# Patient Record
Sex: MALE | Race: WHITE | NOT HISPANIC OR LATINO | Employment: FULL TIME | URBAN - METROPOLITAN AREA
[De-identification: names, ages, dates, MRNs, and addresses within clinical notes are randomized per-mention and may not be internally consistent; named-entity substitution may affect disease eponyms.]

---

## 2023-04-20 ENCOUNTER — TELEPHONE (OUTPATIENT)
Dept: PRIMARY CARE | Facility: CLINIC | Age: 63
End: 2023-04-20
Payer: COMMERCIAL

## 2023-04-20 NOTE — TELEPHONE ENCOUNTER
Patient calling about hernia, he states it is improving but he has not been lifting at all, asking how long you think will take to heal or when he can start lifting things again?  He did not see surgeon about this

## 2023-06-06 LAB
ALANINE AMINOTRANSFERASE (SGPT) (U/L) IN SER/PLAS: 24 U/L (ref 10–52)
ALBUMIN (G/DL) IN SER/PLAS: 4.2 G/DL (ref 3.4–5)
ALKALINE PHOSPHATASE (U/L) IN SER/PLAS: 45 U/L (ref 33–136)
ASPARTATE AMINOTRANSFERASE (SGOT) (U/L) IN SER/PLAS: 23 U/L (ref 9–39)
BILIRUBIN DIRECT (MG/DL) IN SER/PLAS: 0.1 MG/DL (ref 0–0.3)
BILIRUBIN TOTAL (MG/DL) IN SER/PLAS: 0.4 MG/DL (ref 0–1.2)
PROTEIN TOTAL: 6.6 G/DL (ref 6.4–8.2)

## 2023-06-09 ENCOUNTER — HOSPITAL ENCOUNTER (OUTPATIENT)
Dept: DATA CONVERSION | Facility: HOSPITAL | Age: 63
End: 2023-06-09
Attending: SURGERY | Admitting: SURGERY
Payer: COMMERCIAL

## 2023-06-09 DIAGNOSIS — K40.20 BILATERAL INGUINAL HERNIA, WITHOUT OBSTRUCTION OR GANGRENE, NOT SPECIFIED AS RECURRENT: ICD-10-CM

## 2023-06-09 LAB
HEMATOCRIT (%) IN BLOOD BY AUTOMATED COUNT: 45.8 % (ref 41–52)
HEMOGLOBIN (G/DL) IN BLOOD: 15.4 G/DL (ref 13.5–17.5)

## 2023-07-24 ENCOUNTER — TELEPHONE (OUTPATIENT)
Dept: PRIMARY CARE | Facility: CLINIC | Age: 63
End: 2023-07-24
Payer: COMMERCIAL

## 2023-07-24 NOTE — TELEPHONE ENCOUNTER
----- Message from Cindi Smith MA sent at 7/24/2023  2:14 PM EDT -----  Regarding: FW: Lyme disease Lab Test  Contact: [phone]  Please see pcp message. She would like for him to have an appt to discuss it can be a VV.    ----- Message -----  From: Tianna Carbajal MD  Sent: 7/24/2023   2:04 PM EDT  To: Cindi Smith MA  Subject: FW: Lyme disease Lab Test                        I rec a vv at least with marco or jonas  ----- Message -----  From: Cindi Smith MA  Sent: 7/24/2023  11:28 AM EDT  To: Tianna Carbajal MD  Subject: FW: Lyme disease Lab Test                          ----- Message -----  From: Harjit Angeles  Sent: 7/24/2023  11:04 AM EDT  To: #  Subject: Lyme disease Lab Test                            Hello.  I spend a great deal of time outdoors.  About a month or more ago I had a bump on the back of my head.  My wife looked at it and saw a red [place]/dot.  Recently I had an outsized reaction to a poison ivy or thistle resulting in prolonged itchy hives, which are only now beginning to subside.  I'd like to rule out a tick bite/Lyme disease.  Please advise with any recommendations.  Thank you.  Harjit

## 2023-07-27 ENCOUNTER — TELEMEDICINE (OUTPATIENT)
Dept: PRIMARY CARE | Facility: CLINIC | Age: 63
End: 2023-07-27
Payer: COMMERCIAL

## 2023-07-27 ENCOUNTER — LAB (OUTPATIENT)
Dept: LAB | Facility: LAB | Age: 63
End: 2023-07-27
Payer: COMMERCIAL

## 2023-07-27 DIAGNOSIS — R21 RASH OF UNKNOWN CAUSE: Primary | ICD-10-CM

## 2023-07-27 DIAGNOSIS — R21 RASH OF UNKNOWN CAUSE: ICD-10-CM

## 2023-07-27 PROBLEM — R93.1 AGATSTON CORONARY ARTERY CALCIUM SCORE BETWEEN 100 AND 400: Status: ACTIVE | Noted: 2023-07-27

## 2023-07-27 PROBLEM — K46.9 HERNIA, ABDOMINAL: Status: ACTIVE | Noted: 2023-07-27

## 2023-07-27 PROBLEM — R97.20 ELEVATED PSA: Status: ACTIVE | Noted: 2023-07-27

## 2023-07-27 PROBLEM — Z87.09 HISTORY OF ASTHMA: Status: ACTIVE | Noted: 2023-07-27

## 2023-07-27 PROBLEM — E78.5 DYSLIPIDEMIA: Status: ACTIVE | Noted: 2023-07-27

## 2023-07-27 PROBLEM — M25.50 MULTIPLE JOINT PAIN: Status: ACTIVE | Noted: 2023-07-27

## 2023-07-27 PROBLEM — K40.20 NON-RECURRENT BILATERAL INGUINAL HERNIA WITHOUT OBSTRUCTION OR GANGRENE: Status: ACTIVE | Noted: 2023-07-27

## 2023-07-27 PROBLEM — Z12.5 SCREENING FOR PROSTATE CANCER: Status: ACTIVE | Noted: 2023-07-27

## 2023-07-27 PROBLEM — I25.10 ARTERIOSCLEROSIS OF CORONARY ARTERY: Status: ACTIVE | Noted: 2023-07-27

## 2023-07-27 PROBLEM — E55.9 VITAMIN D DEFICIENCY: Status: ACTIVE | Noted: 2023-07-27

## 2023-07-27 LAB
ALANINE AMINOTRANSFERASE (SGPT) (U/L) IN SER/PLAS: 21 U/L (ref 10–52)
ALBUMIN (G/DL) IN SER/PLAS: 4.7 G/DL (ref 3.4–5)
ALKALINE PHOSPHATASE (U/L) IN SER/PLAS: 47 U/L (ref 33–136)
ASPARTATE AMINOTRANSFERASE (SGOT) (U/L) IN SER/PLAS: 23 U/L (ref 9–39)
BILIRUBIN DIRECT (MG/DL) IN SER/PLAS: 0.1 MG/DL (ref 0–0.3)
BILIRUBIN TOTAL (MG/DL) IN SER/PLAS: 0.4 MG/DL (ref 0–1.2)
PROTEIN TOTAL: 7.2 G/DL (ref 6.4–8.2)

## 2023-07-27 PROCEDURE — 86618 LYME DISEASE ANTIBODY: CPT

## 2023-07-27 PROCEDURE — 36415 COLL VENOUS BLD VENIPUNCTURE: CPT

## 2023-07-27 PROCEDURE — 99213 OFFICE O/P EST LOW 20 MIN: CPT | Performed by: NURSE PRACTITIONER

## 2023-07-27 RX ORDER — ATORVASTATIN CALCIUM 20 MG/1
1 TABLET, FILM COATED ORAL NIGHTLY
COMMUNITY
Start: 2022-07-07 | End: 2024-02-19 | Stop reason: SDUPTHER

## 2023-07-27 RX ORDER — CHOLECALCIFEROL (VITAMIN D3) 25 MCG
1 TABLET ORAL DAILY
COMMUNITY
Start: 2022-05-20

## 2023-07-27 NOTE — PROGRESS NOTES
Problem List Items Addressed This Visit    None  Visit Diagnoses       Rash of unknown cause    -  Primary    sounds to be contact derm  - declined medrol and/or topical steroid  - may need derm   check Lyme for reassurance given time spent in the woods  fu prn    Relevant Orders    Lyme AB Modified 2-Tier Testing, 1st Tier           An interactive audio/visual telecommunication system which permits real time communications between the patient (at the originating site) and provider (at the distant site) was utilized to provide this telehealth service.    Verbal consent was requested and obtained from the patient for this telehealth visit.  We have confirmed the patient wishes to see me, Lyndsey Helton, a board certified family nurse practitioner with an active [place] APRN license as well as verified the patient's identity and physical location in [place].    Subjective   Patient ID: Harjti Angeles is a 63 y.o. male who presents for No chief complaint on file..  HPI  Pulled weeds beginning of June  Within days entire body covered in hives  Lingering but greatly improved  - started on forearms (pulled weeds)  - bilat sides  - back  Rash resolved in most areas but skin remains very itchy  Hot shower exacerbates this rash  Spots of red  Doesn't think raised  Blanchable per his report  Skin is intact no drainage   No noticeable bulls eye     Completed terbinafine completed 10 days ago  - took for 90 days  No new meds, herbals, supps, teas, contacts  No new animals in the home     Tried some benadryl in the first few days  Eucerin skin calming cream wo relief     He did have this rash when he had hernia surgery 6/9/23     Fatigue - no   Anorexia -no  Headache - no  Neck stiffness - no  Myalgias - no  Arthralgias - no  Regional lymphadenopathy - no  Fever - no    Lots of time outdoors  Outing in woods   - CVNP  Felt bump back of his head  Small red spot  When he got the hives he wonders if this bump wasn't a tick bite     No hx  "Lyme  Has had tick bite previous   He was Lyme negative 5/2022        Review of Systems   All other systems reviewed and are negative.      BP Readings from Last 3 Encounters:   05/18/23 140/84   02/13/23 124/71   02/01/23 138/83      Wt Readings from Last 3 Encounters:   05/18/23 66.7 kg (147 lb 2 oz)   02/13/23 69.5 kg (153 lb 4 oz)   02/01/23 69.9 kg (154 lb)      BMI:   Estimated body mass index is 23.04 kg/m² as calculated from the following:    Height as of 5/18/23: 1.702 m (5' 7\").    Weight as of 5/18/23: 66.7 kg (147 lb 2 oz).    Objective   Physical Exam  Gen: Alert, NAD  Respiratory:  resp effort NL  Neuro:  coordination intact   Mood: normal    Unable to assess skin via virtual visit   "

## 2023-07-31 LAB — B. BURGDORFERI VLSE1/PEPC10 ABS, ELISA: 0.38 IV

## 2023-09-07 VITALS — HEIGHT: 67 IN | BODY MASS INDEX: 23.11 KG/M2 | WEIGHT: 147.27 LBS

## 2023-09-30 NOTE — H&P
History & Physical Reviewed:   I have reviewed the History and Physical dated:  18-May-2023   History and Physical reviewed and relevant findings noted. Patient examined to review pertinent physical  findings.: No significant changes   Home Medications Reviewed: no changes noted   Allergies Reviewed: no changes noted       ERAS (Enhanced Recovery After Surgery):  ·  ERAS Patient: no     Consent:   COVID-19 Consent:  ·  COVID-19 Risk Consent Surgeon has reviewed key risks related to the risk of maverick COVID-19 and if they contract COVID-19 what the risks are.       Electronic Signatures:  Octavio Harrell)  (Signed 09-Jun-2023 14:24)   Authored: History & Physical Reviewed, ERAS, Consent,  Note Completion      Last Updated: 09-Jun-2023 14:24 by Octavio Harrell)

## 2023-10-02 NOTE — OP NOTE
PROCEDURE DETAILS    Preoperative Diagnosis:  Bilateral inguinal hernia, without obstruction or gangrene, not specified as recurrent, K40.20    Postoperative Diagnosis:  Bilateral inguinal hernia, without obstruction or gangrene, not specified as recurrent, K40.20    Surgeon: Octavio Harrell  Resident/Fellow/Other Assistant: Elijah Alcazar    Procedure:  1. ROBOTIC ASSISTED LAPAROSCOPIC BILATERAL INGUINAL HERNIA REPAIR WITH MESH    Anesthesia: No anesthesiologist associated with this case  Estimated Blood Loss: 0  Findings: bilateral direct inguinal hernias  Specimens(s) Collected: no,     Patient Returned To/Condition: improved                                Attestation:   Note Completion:  Attending Attestation I performed the procedure without a resident         Electronic Signatures:  Octavio Harrell)  (Signed 09-Jun-2023 14:24)   Authored: Post-Operative Note, Chart Review, Note Completion      Last Updated: 09-Jun-2023 14:24 by Octavio Harrell)

## 2023-10-16 DIAGNOSIS — R97.20 ABNORMAL PROSTATE SPECIFIC ANTIGEN (PSA): Primary | ICD-10-CM

## 2023-10-17 PROBLEM — L60.3 NAIL DYSTROPHY: Status: ACTIVE | Noted: 2019-07-15
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
## 2023-10-17 PROBLEM — D18.01 HEMANGIOMA OF SKIN AND SUBCUTANEOUS TISSUE: Status: ACTIVE | Noted: 2020-11-03

## 2023-10-17 PROBLEM — S40.862A TICK BITE OF LEFT UPPER ARM: Status: ACTIVE | Noted: 2023-10-17

## 2023-10-17 PROBLEM — L81.4 OTHER MELANIN HYPERPIGMENTATION: Status: ACTIVE | Noted: 2020-11-03

## 2023-10-17 PROBLEM — L82.1 OTHER SEBORRHEIC KERATOSIS: Status: ACTIVE | Noted: 2020-11-03

## 2023-10-17 PROBLEM — B35.1 ONYCHOMYCOSIS: Status: ACTIVE | Noted: 2021-08-01

## 2023-10-17 PROBLEM — L60.8 DISCOLORED NAILS: Status: ACTIVE | Noted: 2019-07-15

## 2023-10-17 PROBLEM — D22.5 MELANOCYTIC NEVI OF TRUNK: Status: ACTIVE | Noted: 2020-11-03

## 2023-10-17 RX ORDER — TERBINAFINE HYDROCHLORIDE 250 MG/1
250 TABLET ORAL
COMMUNITY
Start: 2023-04-21 | End: 2024-02-12 | Stop reason: WASHOUT

## 2023-10-17 RX ORDER — CICLOPIROX 80 MG/ML
SOLUTION TOPICAL
COMMUNITY
End: 2024-02-12 | Stop reason: WASHOUT

## 2023-10-19 ENCOUNTER — OFFICE VISIT (OUTPATIENT)
Dept: ORTHOPEDIC SURGERY | Facility: CLINIC | Age: 63
End: 2023-10-19
Payer: COMMERCIAL

## 2023-10-19 DIAGNOSIS — S83.281A TEAR OF LATERAL MENISCUS OF RIGHT KNEE, CURRENT, UNSPECIFIED TEAR TYPE, INITIAL ENCOUNTER: Primary | ICD-10-CM

## 2023-10-19 PROCEDURE — 99203 OFFICE O/P NEW LOW 30 MIN: CPT | Performed by: ORTHOPAEDIC SURGERY

## 2023-10-19 PROCEDURE — 1036F TOBACCO NON-USER: CPT | Performed by: ORTHOPAEDIC SURGERY

## 2023-10-19 RX ORDER — METHYLPREDNISOLONE 4 MG/1
TABLET ORAL
Qty: 21 TABLET | Refills: 0 | Status: SHIPPED | OUTPATIENT
Start: 2023-10-19 | End: 2024-02-12 | Stop reason: WASHOUT

## 2023-10-19 RX ORDER — IBUPROFEN 800 MG/1
800 TABLET ORAL EVERY 8 HOURS PRN
Qty: 90 TABLET | Refills: 0 | Status: SHIPPED | OUTPATIENT
Start: 2023-10-19 | End: 2023-11-27 | Stop reason: SDUPTHER

## 2023-10-19 ASSESSMENT — PAIN SCALES - GENERAL: PAINLEVEL_OUTOF10: 2

## 2023-10-19 ASSESSMENT — PAIN - FUNCTIONAL ASSESSMENT: PAIN_FUNCTIONAL_ASSESSMENT: 0-10

## 2023-10-19 NOTE — PROGRESS NOTES
History of Present Illness   Chief Complaint   Patient presents with    Right Knee - Pain     Rt Knee Pain, Xrays 3/1/23       History of Present Illness   Patient presents after sustaining an injury to their knee in December 2022 while picking up some furniture.  He felt a twist and something pop inside the knee.  He struggled the last year to get any significant progress.  He states he has been unable to run or extended periods of activities his knee has a reproduce mechanical symptoms and swells.  He is done ibuprofen ice home exercise over-the-counter bracing and continues to have mechanical symptoms.  They are complaining of ache, pain and discomfort.  The knee continues to swell since the injury.  Patient also complains of mechanical symptoms of catching, locking and popping.  Pain is aggravated by use and relieved by rest.  []  Imaging  Radiographs Knee: No acute fractures or dislocations.  No arthritic change and well-preserved joint space    Assessment:   Right knee lateral meniscus tear  Right knee hamstring tendinitis/IT band syndrome    Plan:  We reviewed the role of imaging, physical therapy, injections and the time frame to healing and correlation with outcome.  At this point I recommended getting an MRI and advanced imaging for potential underlying internal derangement.  Discussed the possibility of meniscus, cartilage or ligament pathology and the potential need for surgery.  Patient will plan to follow-up with us after MRI for further recommendations on ongoing care    Medrol Dosepak  NSAID: Ibuprofen 800 mg prescription sent to the pharmacy.  GI side effects and medical risks discussed  Ice: 30 minutes on and off  Exercise home program: Medically directed knee therapy / Handout given       Physical Exam  Well-nourished, well-developed. No acute distress. Alert and oriented x3. Responds appropriately to questioning. Good mood. Normal affect.  Physical Exam  Right knee:  Skin healthy and intact  No  gross swelling or ecchymosis  Trace to 1+ Effusion:   ROM: 120  Crepitance with range of motion  Pain along the posterior lateral joint line.  Pain along the posterior biceps near the popliteal tendon  Positive Ry´s test/PositiveApley Grind  Neurovascular exam normal distally  Palpable pedal pulse and good cap refill     Review of Systems   GENERAL: Negative for malaise, significant weight loss, fever  MUSCULOSKELETAL: See HPI  NEURO:  Negative     History reviewed. No pertinent past medical history.    Medication Documentation Review Audit       Reviewed by Dipak Garg (Technologist) on 10/19/23 at 0902      Medication Order Taking? Sig Documenting Provider Last Dose Status   atorvastatin (Lipitor) 20 mg tablet [id number]  Take 1 tablet (20 mg) by mouth once daily at bedtime. Historical Provider, MD  Active   cholecalciferol (Vitamin D-3) 25 MCG (1000 UT) tablet [id number]  Take 1 tablet (25 mcg) by mouth once daily. Historical Provider, MD  Active   ciclopirox (Penlac) 8 % solution [id number]  APPLY TO AFFECTED AREA DAILY AT BEDTIME. Historical Provider, MD  Active   terbinafine (LamISIL) 250 mg tablet [id number]  Take 1 tablet (250 mg) by mouth once daily. Historical Provider, MD  Active                    No Known Allergies    Social History     Socioeconomic History    Marital status:      Spouse name: Not on file    Number of children: Not on file    Years of education: Not on file    Highest education level: Not on file   Occupational History    Not on file   Tobacco Use    Smoking status: Never    Smokeless tobacco: Never   Substance and Sexual Activity    Alcohol use: Not on file    Drug use: Not on file    Sexual activity: Not on file   Other Topics Concern    Not on file   Social History Narrative    Not on file     Social Determinants of Health     Financial Resource Strain: Not on file   Food Insecurity: Not on file   Transportation Needs: Not on file   Physical Activity: Not on file   Stress: Not  on file   Social Connections: Not on file   Intimate Partner Violence: Not on file   Housing Stability: Not on file       Past Surgical History:   Procedure Laterality Date    HERNIA REPAIR      bilateral inguinal hernia repair; 6/9/2023    OTHER SURGICAL HISTORY  02/15/2021    Colonoscopy    OTHER SURGICAL HISTORY  02/15/2021    Tonsillectomy       No results found.

## 2023-11-14 ENCOUNTER — ANCILLARY PROCEDURE (OUTPATIENT)
Dept: RADIOLOGY | Facility: CLINIC | Age: 63
End: 2023-11-14
Payer: COMMERCIAL

## 2023-11-14 DIAGNOSIS — S83.281A TEAR OF LATERAL MENISCUS OF RIGHT KNEE, CURRENT, UNSPECIFIED TEAR TYPE, INITIAL ENCOUNTER: ICD-10-CM

## 2023-11-14 PROCEDURE — 73721 MRI JNT OF LWR EXTRE W/O DYE: CPT | Mod: RT

## 2023-11-14 PROCEDURE — 73721 MRI JNT OF LWR EXTRE W/O DYE: CPT | Mod: RIGHT SIDE | Performed by: RADIOLOGY

## 2023-11-27 DIAGNOSIS — S83.281A TEAR OF LATERAL MENISCUS OF RIGHT KNEE, CURRENT, UNSPECIFIED TEAR TYPE, INITIAL ENCOUNTER: ICD-10-CM

## 2023-11-27 RX ORDER — IBUPROFEN 800 MG/1
800 TABLET ORAL EVERY 8 HOURS PRN
Qty: 90 TABLET | Refills: 0 | Status: SHIPPED | OUTPATIENT
Start: 2023-11-27 | End: 2023-12-29 | Stop reason: SDUPTHER

## 2023-12-11 ENCOUNTER — OFFICE VISIT (OUTPATIENT)
Dept: ORTHOPEDIC SURGERY | Facility: CLINIC | Age: 63
End: 2023-12-11
Payer: COMMERCIAL

## 2023-12-11 ENCOUNTER — HOSPITAL ENCOUNTER (OUTPATIENT)
Facility: HOSPITAL | Age: 63
Setting detail: OUTPATIENT SURGERY
End: 2023-12-11
Attending: ORTHOPAEDIC SURGERY | Admitting: ORTHOPAEDIC SURGERY
Payer: COMMERCIAL

## 2023-12-11 DIAGNOSIS — S83.231D COMPLEX TEAR OF MEDIAL MENISCUS OF RIGHT KNEE AS CURRENT INJURY, SUBSEQUENT ENCOUNTER: Primary | ICD-10-CM

## 2023-12-11 PROBLEM — S83.241A OTHER TEAR OF MEDIAL MENISCUS, CURRENT INJURY, RIGHT KNEE, INITIAL ENCOUNTER: Status: ACTIVE | Noted: 2023-12-11

## 2023-12-11 PROCEDURE — 99214 OFFICE O/P EST MOD 30 MIN: CPT | Performed by: ORTHOPAEDIC SURGERY

## 2023-12-11 PROCEDURE — 1036F TOBACCO NON-USER: CPT | Performed by: ORTHOPAEDIC SURGERY

## 2023-12-11 NOTE — PROGRESS NOTES
History of Present Illness   Chief Complaint   Patient presents with    Right Knee - Follow-up     MRI REVIEW       History of Present Illness   Patient presents after sustaining an injury to their knee in December 2022 while picking up some furniture.  He felt a twist and something pop inside the knee.  He struggled the last year to get any significant progress.  He states he has been unable to run or extended periods of activities his knee has a reproduce mechanical symptoms and swells.  He is done ibuprofen ice home exercise over-the-counter bracing and continues to have mechanical symptoms.  They are complaining of ache, pain and discomfort.  The knee continues to swell since the injury.  Patient also complains of mechanical symptoms of catching, locking and popping.  Pain is aggravated by use and relieved by rest.  He is a  for a adult assistance/assisted living/nursing home  Imaging  Radiographs Knee: No acute fractures or dislocations.  No arthritic change and well-preserved joint space  MRI: Radial flap tear of the medial meniscus.  It is flipped underneath the medial tibia and the posterior medial aspect.  Root is intact.  Some thickening near the popliteus.    Assessment:   Right knee medial meniscus tear  Right knee hamstring tendinitis/IT band syndrome/popliteal tendinitis    Plan:  Menisectomy plan of care:  Risks benefits and alternatives to surgery were discussed including but not limited to Infection, bleeding, neurovascular injury, pain and dysfunction, hardware related complications including cutout failure breakage, loss of function, motion, and permanent disability as well as the cardiovascular and pulmonary complications from anesthesia including death and DVT. Patient and family accept these risks.  We discussed specifically post meniscectomy pain, incomplete pain relief, meniscus retear, progressive arthritis, intraoperative decisions in regards to other pathology, and the potential for  future revision surgeries including arthroplasty    Plan for outpatient surgery   1. 1 week postop follow up   2. Percocet for postop pain relief. OARRS has been reviewed and is consistent with prescribed medications. This report is scanned into the electronic medical record. The risks of abuse, dependence, addiction and diversion were considered. The medication is felt to be clinically appropriate based on documented diagnosis .  3.  Pre-CERT for removal postoperative knee brace  Physical Exam  Well-nourished, well-developed. No acute distress. Alert and oriented x3. Responds appropriately to questioning. Good mood. Normal affect.  Physical Exam  Right knee:  Skin healthy and intact  No gross swelling or ecchymosis  Trace to 1+ Effusion:   ROM: 120  Crepitance with range of motion  Pain along the posterior lateral joint line.  Pain along the posterior biceps near the popliteal tendon  Positive Ry´s test/PositiveApley Grind  Neurovascular exam normal distally  Palpable pedal pulse and good cap refill     Review of Systems   GENERAL: Negative for malaise, significant weight loss, fever  MUSCULOSKELETAL: See HPI  NEURO:  Negative     History reviewed. No pertinent past medical history.    Medication Documentation Review Audit       Reviewed by Pauline Marrero MA (Medical Assistant) on 12/11/23 at 1020      Medication Order Taking? Sig Documenting Provider Last Dose Status   atorvastatin (Lipitor) 20 mg tablet [id number]  Take 1 tablet (20 mg) by mouth once daily at bedtime. Historical Provider, MD  Active   cholecalciferol (Vitamin D-3) 25 MCG (1000 UT) tablet [id number]  Take 1 tablet (25 mcg) by mouth once daily. Historical Provider, MD  Active   ciclopirox (Penlac) 8 % solution [id number]  APPLY TO AFFECTED AREA DAILY AT BEDTIME. Historical Provider, MD  Active   ibuprofen 800 mg tablet [id number]  TAKE 1 TABLET (800 MG) BY MOUTH EVERY 8 HOURS IF NEEDED FOR MILD PAIN (1 - 3). Juan Reaves MD  Active    methylPREDNISolone (Medrol Dospak) 4 mg tablets [id number]  Use as directed by package instructions Juan Reaves MD  Active   terbinafine (LamISIL) 250 mg tablet [id number]  Take 1 tablet (250 mg) by mouth once daily. Historical Provider, MD  Active                    No Known Allergies    Social History     Socioeconomic History    Marital status:      Spouse name: Not on file    Number of children: Not on file    Years of education: Not on file    Highest education level: Not on file   Occupational History    Not on file   Tobacco Use    Smoking status: Never    Smokeless tobacco: Never   Substance and Sexual Activity    Alcohol use: Not on file    Drug use: Not on file    Sexual activity: Not on file   Other Topics Concern    Not on file   Social History Narrative    Not on file     Social Determinants of Health     Financial Resource Strain: Not on file   Food Insecurity: Not on file   Transportation Needs: Not on file   Physical Activity: Not on file   Stress: Not on file   Social Connections: Not on file   Intimate Partner Violence: Not on file   Housing Stability: Not on file       Past Surgical History:   Procedure Laterality Date    HERNIA REPAIR      bilateral inguinal hernia repair; 6/9/2023    OTHER SURGICAL HISTORY  02/15/2021    Colonoscopy    OTHER SURGICAL HISTORY  02/15/2021    Tonsillectomy       No results found.

## 2023-12-20 ENCOUNTER — TELEPHONE (OUTPATIENT)
Dept: ORTHOPEDIC SURGERY | Facility: CLINIC | Age: 63
End: 2023-12-20
Payer: COMMERCIAL

## 2023-12-20 NOTE — TELEPHONE ENCOUNTER
12/20/23  LVM for pt re availability of crutches needed for upcoming sx.  Asked him to contact our office and let us know whether he has some or needs to be scheduled for a fitting.  
[place]

## 2023-12-21 ENCOUNTER — HOSPITAL ENCOUNTER (OUTPATIENT)
Dept: CARDIOLOGY | Facility: CLINIC | Age: 63
Discharge: HOME | End: 2023-12-21
Payer: COMMERCIAL

## 2023-12-21 ENCOUNTER — LAB (OUTPATIENT)
Dept: LAB | Facility: LAB | Age: 63
End: 2023-12-21
Payer: COMMERCIAL

## 2023-12-21 DIAGNOSIS — S83.241A OTHER TEAR OF MEDIAL MENISCUS, CURRENT INJURY, RIGHT KNEE, INITIAL ENCOUNTER: ICD-10-CM

## 2023-12-21 DIAGNOSIS — R97.20 ABNORMAL PROSTATE SPECIFIC ANTIGEN (PSA): ICD-10-CM

## 2023-12-21 LAB
ALBUMIN SERPL BCP-MCNC: 4.4 G/DL (ref 3.4–5)
ALP SERPL-CCNC: 45 U/L (ref 33–136)
ALT SERPL W P-5'-P-CCNC: 15 U/L (ref 10–52)
ANION GAP SERPL CALC-SCNC: 12 MMOL/L (ref 10–20)
APTT PPP: 37 SECONDS (ref 27–38)
AST SERPL W P-5'-P-CCNC: 16 U/L (ref 9–39)
BASOPHILS # BLD AUTO: 0.04 X10*3/UL (ref 0–0.1)
BASOPHILS NFR BLD AUTO: 0.6 %
BILIRUB SERPL-MCNC: 0.6 MG/DL (ref 0–1.2)
BUN SERPL-MCNC: 19 MG/DL (ref 6–23)
CALCIUM SERPL-MCNC: 9.1 MG/DL (ref 8.6–10.3)
CHLORIDE SERPL-SCNC: 106 MMOL/L (ref 98–107)
CO2 SERPL-SCNC: 28 MMOL/L (ref 21–32)
CREAT SERPL-MCNC: 1.07 MG/DL (ref 0.5–1.3)
EOSINOPHIL # BLD AUTO: 0 X10*3/UL (ref 0–0.7)
EOSINOPHIL NFR BLD AUTO: 0 %
ERYTHROCYTE [DISTWIDTH] IN BLOOD BY AUTOMATED COUNT: 12.1 % (ref 11.5–14.5)
GFR SERPL CREATININE-BSD FRML MDRD: 79 ML/MIN/1.73M*2
GLUCOSE SERPL-MCNC: 91 MG/DL (ref 74–99)
HCT VFR BLD AUTO: 42.9 % (ref 41–52)
HGB BLD-MCNC: 14.4 G/DL (ref 13.5–17.5)
IMM GRANULOCYTES # BLD AUTO: 0.01 X10*3/UL (ref 0–0.7)
IMM GRANULOCYTES NFR BLD AUTO: 0.2 % (ref 0–0.9)
INR PPP: 1 (ref 0.9–1.1)
LYMPHOCYTES # BLD AUTO: 1.72 X10*3/UL (ref 1.2–4.8)
LYMPHOCYTES NFR BLD AUTO: 26.6 %
MCH RBC QN AUTO: 31.5 PG (ref 26–34)
MCHC RBC AUTO-ENTMCNC: 33.6 G/DL (ref 32–36)
MCV RBC AUTO: 94 FL (ref 80–100)
MONOCYTES # BLD AUTO: 0.58 X10*3/UL (ref 0.1–1)
MONOCYTES NFR BLD AUTO: 9 %
NEUTROPHILS # BLD AUTO: 4.11 X10*3/UL (ref 1.2–7.7)
NEUTROPHILS NFR BLD AUTO: 63.6 %
NRBC BLD-RTO: 0 /100 WBCS (ref 0–0)
PLATELET # BLD AUTO: 281 X10*3/UL (ref 150–450)
POTASSIUM SERPL-SCNC: 4.5 MMOL/L (ref 3.5–5.3)
PROT SERPL-MCNC: 6.3 G/DL (ref 6.4–8.2)
PROTHROMBIN TIME: 10.8 SECONDS (ref 9.8–12.8)
PSA SERPL-MCNC: 2.13 NG/ML
RBC # BLD AUTO: 4.57 X10*6/UL (ref 4.5–5.9)
SODIUM SERPL-SCNC: 141 MMOL/L (ref 136–145)
WBC # BLD AUTO: 6.5 X10*3/UL (ref 4.4–11.3)

## 2023-12-21 PROCEDURE — 80053 COMPREHEN METABOLIC PANEL: CPT

## 2023-12-21 PROCEDURE — 93005 ELECTROCARDIOGRAM TRACING: CPT

## 2023-12-21 PROCEDURE — 85025 COMPLETE CBC W/AUTO DIFF WBC: CPT

## 2023-12-21 PROCEDURE — 85730 THROMBOPLASTIN TIME PARTIAL: CPT

## 2023-12-21 PROCEDURE — 84153 ASSAY OF PSA TOTAL: CPT

## 2023-12-21 PROCEDURE — 93010 ELECTROCARDIOGRAM REPORT: CPT | Performed by: STUDENT IN AN ORGANIZED HEALTH CARE EDUCATION/TRAINING PROGRAM

## 2023-12-21 PROCEDURE — 85610 PROTHROMBIN TIME: CPT

## 2023-12-21 PROCEDURE — 36415 COLL VENOUS BLD VENIPUNCTURE: CPT

## 2023-12-24 LAB
ATRIAL RATE: 56 BPM
P AXIS: 72 DEGREES
P OFFSET: 192 MS
P ONSET: 145 MS
PR INTERVAL: 144 MS
Q ONSET: 217 MS
QRS COUNT: 10 BEATS
QRS DURATION: 98 MS
QT INTERVAL: 428 MS
QTC CALCULATION(BAZETT): 413 MS
QTC FREDERICIA: 418 MS
R AXIS: 79 DEGREES
T AXIS: 57 DEGREES
T OFFSET: 431 MS
VENTRICULAR RATE: 56 BPM

## 2023-12-26 DIAGNOSIS — S83.281A TEAR OF LATERAL MENISCUS OF RIGHT KNEE, CURRENT, UNSPECIFIED TEAR TYPE, INITIAL ENCOUNTER: ICD-10-CM

## 2023-12-27 ENCOUNTER — TELEPHONE (OUTPATIENT)
Dept: ORTHOPEDIC SURGERY | Facility: CLINIC | Age: 63
End: 2023-12-27
Payer: COMMERCIAL

## 2023-12-27 ENCOUNTER — TELEPHONE (OUTPATIENT)
Dept: PREADMISSION TESTING | Facility: HOSPITAL | Age: 63
End: 2023-12-27

## 2023-12-27 NOTE — TELEPHONE ENCOUNTER
12/27/23  Spoke w/ pt and he does need crutches, but may be postponing sx due to upcoming family vacation.  He will contact us when he wants to schedule for crutches.

## 2023-12-29 RX ORDER — IBUPROFEN 800 MG/1
800 TABLET ORAL EVERY 8 HOURS PRN
Qty: 90 TABLET | Refills: 0 | Status: SHIPPED | OUTPATIENT
Start: 2023-12-29 | End: 2024-01-28

## 2024-01-08 ENCOUNTER — APPOINTMENT (OUTPATIENT)
Dept: ORTHOPEDIC SURGERY | Facility: CLINIC | Age: 64
End: 2024-01-08
Payer: COMMERCIAL

## 2024-02-06 ENCOUNTER — LAB (OUTPATIENT)
Dept: LAB | Facility: LAB | Age: 64
End: 2024-02-06
Payer: COMMERCIAL

## 2024-02-06 DIAGNOSIS — S83.241A OTHER TEAR OF MEDIAL MENISCUS, CURRENT INJURY, RIGHT KNEE, INITIAL ENCOUNTER: ICD-10-CM

## 2024-02-06 LAB
ALBUMIN SERPL BCP-MCNC: 4.4 G/DL (ref 3.4–5)
ALP SERPL-CCNC: 44 U/L (ref 33–136)
ALT SERPL W P-5'-P-CCNC: 17 U/L (ref 10–52)
ANION GAP SERPL CALC-SCNC: 10 MMOL/L (ref 10–20)
APTT PPP: 34 SECONDS (ref 27–38)
AST SERPL W P-5'-P-CCNC: 19 U/L (ref 9–39)
BASOPHILS # BLD AUTO: 0.05 X10*3/UL (ref 0–0.1)
BASOPHILS NFR BLD AUTO: 1 %
BILIRUB SERPL-MCNC: 0.5 MG/DL (ref 0–1.2)
BUN SERPL-MCNC: 15 MG/DL (ref 6–23)
CALCIUM SERPL-MCNC: 9.2 MG/DL (ref 8.6–10.3)
CHLORIDE SERPL-SCNC: 102 MMOL/L (ref 98–107)
CO2 SERPL-SCNC: 28 MMOL/L (ref 21–32)
CREAT SERPL-MCNC: 1.13 MG/DL (ref 0.5–1.3)
EGFRCR SERPLBLD CKD-EPI 2021: 74 ML/MIN/1.73M*2
EOSINOPHIL # BLD AUTO: 0.03 X10*3/UL (ref 0–0.7)
EOSINOPHIL NFR BLD AUTO: 0.6 %
ERYTHROCYTE [DISTWIDTH] IN BLOOD BY AUTOMATED COUNT: 12.1 % (ref 11.5–14.5)
GLUCOSE SERPL-MCNC: 107 MG/DL (ref 74–99)
HCT VFR BLD AUTO: 42.7 % (ref 41–52)
HGB BLD-MCNC: 14.3 G/DL (ref 13.5–17.5)
IMM GRANULOCYTES # BLD AUTO: 0.02 X10*3/UL (ref 0–0.7)
IMM GRANULOCYTES NFR BLD AUTO: 0.4 % (ref 0–0.9)
INR PPP: 1 (ref 0.9–1.1)
LYMPHOCYTES # BLD AUTO: 1.5 X10*3/UL (ref 1.2–4.8)
LYMPHOCYTES NFR BLD AUTO: 28.7 %
MCH RBC QN AUTO: 31 PG (ref 26–34)
MCHC RBC AUTO-ENTMCNC: 33.5 G/DL (ref 32–36)
MCV RBC AUTO: 92 FL (ref 80–100)
MONOCYTES # BLD AUTO: 0.42 X10*3/UL (ref 0.1–1)
MONOCYTES NFR BLD AUTO: 8 %
NEUTROPHILS # BLD AUTO: 3.2 X10*3/UL (ref 1.2–7.7)
NEUTROPHILS NFR BLD AUTO: 61.3 %
NRBC BLD-RTO: 0 /100 WBCS (ref 0–0)
PLATELET # BLD AUTO: 253 X10*3/UL (ref 150–450)
POTASSIUM SERPL-SCNC: 4.6 MMOL/L (ref 3.5–5.3)
PROT SERPL-MCNC: 6.5 G/DL (ref 6.4–8.2)
PROTHROMBIN TIME: 10.9 SECONDS (ref 9.8–12.8)
RBC # BLD AUTO: 4.62 X10*6/UL (ref 4.5–5.9)
SODIUM SERPL-SCNC: 135 MMOL/L (ref 136–145)
WBC # BLD AUTO: 5.2 X10*3/UL (ref 4.4–11.3)

## 2024-02-06 PROCEDURE — 85610 PROTHROMBIN TIME: CPT

## 2024-02-06 PROCEDURE — 85730 THROMBOPLASTIN TIME PARTIAL: CPT

## 2024-02-06 PROCEDURE — 85025 COMPLETE CBC W/AUTO DIFF WBC: CPT

## 2024-02-06 PROCEDURE — 36415 COLL VENOUS BLD VENIPUNCTURE: CPT

## 2024-02-06 PROCEDURE — 80053 COMPREHEN METABOLIC PANEL: CPT

## 2024-02-12 DIAGNOSIS — S83.231D COMPLEX TEAR OF MEDIAL MENISCUS OF RIGHT KNEE AS CURRENT INJURY, SUBSEQUENT ENCOUNTER: ICD-10-CM

## 2024-02-12 NOTE — PREPROCEDURE INSTRUCTIONS
NPO Instructions:  Do not eat any food after midnight the night before your surgery/procedure.    Additional Instructions:

## 2024-02-13 ENCOUNTER — ANESTHESIA EVENT (OUTPATIENT)
Dept: OPERATING ROOM | Facility: HOSPITAL | Age: 64
End: 2024-02-13
Payer: COMMERCIAL

## 2024-02-13 RX ORDER — SODIUM CHLORIDE, SODIUM LACTATE, POTASSIUM CHLORIDE, CALCIUM CHLORIDE 600; 310; 30; 20 MG/100ML; MG/100ML; MG/100ML; MG/100ML
100 INJECTION, SOLUTION INTRAVENOUS CONTINUOUS
Status: CANCELLED | OUTPATIENT
Start: 2024-02-13

## 2024-02-13 RX ORDER — OXYCODONE HYDROCHLORIDE 5 MG/1
5 TABLET ORAL EVERY 4 HOURS PRN
Status: CANCELLED | OUTPATIENT
Start: 2024-02-13

## 2024-02-14 ENCOUNTER — ANESTHESIA (OUTPATIENT)
Dept: OPERATING ROOM | Facility: HOSPITAL | Age: 64
End: 2024-02-14
Payer: COMMERCIAL

## 2024-02-14 ENCOUNTER — HOSPITAL ENCOUNTER (OUTPATIENT)
Facility: HOSPITAL | Age: 64
Setting detail: OUTPATIENT SURGERY
Discharge: HOME | End: 2024-02-14
Attending: ORTHOPAEDIC SURGERY | Admitting: ORTHOPAEDIC SURGERY
Payer: COMMERCIAL

## 2024-02-14 VITALS
RESPIRATION RATE: 16 BRPM | BODY MASS INDEX: 23.01 KG/M2 | WEIGHT: 146.61 LBS | SYSTOLIC BLOOD PRESSURE: 138 MMHG | HEIGHT: 67 IN | TEMPERATURE: 97 F | HEART RATE: 74 BPM | DIASTOLIC BLOOD PRESSURE: 77 MMHG | OXYGEN SATURATION: 99 %

## 2024-02-14 DIAGNOSIS — S83.241A OTHER TEAR OF MEDIAL MENISCUS, CURRENT INJURY, RIGHT KNEE, INITIAL ENCOUNTER: Primary | ICD-10-CM

## 2024-02-14 PROBLEM — E78.2 MODERATE MIXED HYPERLIPIDEMIA NOT REQUIRING STATIN THERAPY: Status: ACTIVE | Noted: 2024-02-14

## 2024-02-14 PROCEDURE — A4217 STERILE WATER/SALINE, 500 ML: HCPCS | Performed by: ORTHOPAEDIC SURGERY

## 2024-02-14 PROCEDURE — 3600000004 HC OR TIME - INITIAL BASE CHARGE - PROCEDURE LEVEL FOUR: Performed by: ORTHOPAEDIC SURGERY

## 2024-02-14 PROCEDURE — 2500000004 HC RX 250 GENERAL PHARMACY W/ HCPCS (ALT 636 FOR OP/ED): Performed by: ANESTHESIOLOGY

## 2024-02-14 PROCEDURE — 3700000002 HC GENERAL ANESTHESIA TIME - EACH INCREMENTAL 1 MINUTE: Performed by: ORTHOPAEDIC SURGERY

## 2024-02-14 PROCEDURE — 7100000010 HC PHASE TWO TIME - EACH INCREMENTAL 1 MINUTE: Performed by: ORTHOPAEDIC SURGERY

## 2024-02-14 PROCEDURE — 7100000001 HC RECOVERY ROOM TIME - INITIAL BASE CHARGE: Performed by: ORTHOPAEDIC SURGERY

## 2024-02-14 PROCEDURE — 2500000004 HC RX 250 GENERAL PHARMACY W/ HCPCS (ALT 636 FOR OP/ED): Performed by: ORTHOPAEDIC SURGERY

## 2024-02-14 PROCEDURE — 7100000009 HC PHASE TWO TIME - INITIAL BASE CHARGE: Performed by: ORTHOPAEDIC SURGERY

## 2024-02-14 PROCEDURE — 29881 ARTHRS KNE SRG MNISECTMY M/L: CPT | Performed by: ORTHOPAEDIC SURGERY

## 2024-02-14 PROCEDURE — 2720000007 HC OR 272 NO HCPCS: Performed by: ORTHOPAEDIC SURGERY

## 2024-02-14 PROCEDURE — 2500000005 HC RX 250 GENERAL PHARMACY W/O HCPCS: Performed by: ANESTHESIOLOGY

## 2024-02-14 PROCEDURE — 7100000002 HC RECOVERY ROOM TIME - EACH INCREMENTAL 1 MINUTE: Performed by: ORTHOPAEDIC SURGERY

## 2024-02-14 PROCEDURE — 2500000005 HC RX 250 GENERAL PHARMACY W/O HCPCS: Performed by: ORTHOPAEDIC SURGERY

## 2024-02-14 PROCEDURE — 3600000009 HC OR TIME - EACH INCREMENTAL 1 MINUTE - PROCEDURE LEVEL FOUR: Performed by: ORTHOPAEDIC SURGERY

## 2024-02-14 PROCEDURE — 3700000001 HC GENERAL ANESTHESIA TIME - INITIAL BASE CHARGE: Performed by: ORTHOPAEDIC SURGERY

## 2024-02-14 RX ORDER — SODIUM CHLORIDE, SODIUM LACTATE, POTASSIUM CHLORIDE, CALCIUM CHLORIDE 600; 310; 30; 20 MG/100ML; MG/100ML; MG/100ML; MG/100ML
100 INJECTION, SOLUTION INTRAVENOUS CONTINUOUS
Status: DISCONTINUED | OUTPATIENT
Start: 2024-02-14 | End: 2024-02-14 | Stop reason: HOSPADM

## 2024-02-14 RX ORDER — BUPIVACAINE HCL/EPINEPHRINE 0.25-.0005
VIAL (ML) INJECTION AS NEEDED
Status: DISCONTINUED | OUTPATIENT
Start: 2024-02-14 | End: 2024-02-14 | Stop reason: HOSPADM

## 2024-02-14 RX ORDER — PROPOFOL 10 MG/ML
INJECTION, EMULSION INTRAVENOUS AS NEEDED
Status: DISCONTINUED | OUTPATIENT
Start: 2024-02-14 | End: 2024-02-14

## 2024-02-14 RX ORDER — FENTANYL CITRATE 50 UG/ML
50 INJECTION, SOLUTION INTRAMUSCULAR; INTRAVENOUS EVERY 5 MIN PRN
Status: DISCONTINUED | OUTPATIENT
Start: 2024-02-14 | End: 2024-02-14 | Stop reason: HOSPADM

## 2024-02-14 RX ORDER — SODIUM CHLORIDE 9 MG/ML
100 INJECTION, SOLUTION INTRAVENOUS CONTINUOUS
Status: DISCONTINUED | OUTPATIENT
Start: 2024-02-14 | End: 2024-02-14 | Stop reason: HOSPADM

## 2024-02-14 RX ORDER — DEXAMETHASONE SODIUM PHOSPHATE 100 MG/10ML
INJECTION INTRAMUSCULAR; INTRAVENOUS AS NEEDED
Status: DISCONTINUED | OUTPATIENT
Start: 2024-02-14 | End: 2024-02-14

## 2024-02-14 RX ORDER — LIDOCAINE HYDROCHLORIDE 20 MG/ML
INJECTION, SOLUTION INFILTRATION; PERINEURAL AS NEEDED
Status: DISCONTINUED | OUTPATIENT
Start: 2024-02-14 | End: 2024-02-14

## 2024-02-14 RX ORDER — FENTANYL CITRATE 50 UG/ML
INJECTION, SOLUTION INTRAMUSCULAR; INTRAVENOUS AS NEEDED
Status: DISCONTINUED | OUTPATIENT
Start: 2024-02-14 | End: 2024-02-14

## 2024-02-14 RX ORDER — PHENYLEPHRINE HCL IN 0.9% NACL 1 MG/10 ML
SYRINGE (ML) INTRAVENOUS AS NEEDED
Status: DISCONTINUED | OUTPATIENT
Start: 2024-02-14 | End: 2024-02-14

## 2024-02-14 RX ORDER — ONDANSETRON HYDROCHLORIDE 2 MG/ML
4 INJECTION, SOLUTION INTRAVENOUS ONCE AS NEEDED
Status: DISCONTINUED | OUTPATIENT
Start: 2024-02-14 | End: 2024-02-14 | Stop reason: HOSPADM

## 2024-02-14 RX ORDER — MEPERIDINE HYDROCHLORIDE 25 MG/ML
12.5 INJECTION INTRAMUSCULAR; INTRAVENOUS; SUBCUTANEOUS EVERY 10 MIN PRN
Status: DISCONTINUED | OUTPATIENT
Start: 2024-02-14 | End: 2024-02-14 | Stop reason: HOSPADM

## 2024-02-14 RX ORDER — CEFAZOLIN SODIUM 2 G/100ML
2 INJECTION, SOLUTION INTRAVENOUS ONCE
Status: COMPLETED | OUTPATIENT
Start: 2024-02-14 | End: 2024-02-14

## 2024-02-14 RX ORDER — OXYCODONE AND ACETAMINOPHEN 5; 325 MG/1; MG/1
1 TABLET ORAL EVERY 6 HOURS PRN
Qty: 28 TABLET | Refills: 0 | Status: SHIPPED | OUTPATIENT
Start: 2024-02-14 | End: 2024-02-19 | Stop reason: ALTCHOICE

## 2024-02-14 RX ORDER — SODIUM CHLORIDE 0.9 G/100ML
IRRIGANT IRRIGATION AS NEEDED
Status: DISCONTINUED | OUTPATIENT
Start: 2024-02-14 | End: 2024-02-14 | Stop reason: HOSPADM

## 2024-02-14 RX ORDER — MIDAZOLAM HYDROCHLORIDE 1 MG/ML
INJECTION, SOLUTION INTRAMUSCULAR; INTRAVENOUS AS NEEDED
Status: DISCONTINUED | OUTPATIENT
Start: 2024-02-14 | End: 2024-02-14

## 2024-02-14 RX ADMIN — FENTANYL CITRATE 50 MCG: 50 INJECTION, SOLUTION INTRAMUSCULAR; INTRAVENOUS at 10:27

## 2024-02-14 RX ADMIN — MIDAZOLAM 2 MG: 1 INJECTION INTRAMUSCULAR; INTRAVENOUS at 10:05

## 2024-02-14 RX ADMIN — PROPOFOL 150 MG: 10 INJECTION, EMULSION INTRAVENOUS at 10:10

## 2024-02-14 RX ADMIN — CEFAZOLIN SODIUM 2 G: 2 INJECTION, SOLUTION INTRAVENOUS at 10:08

## 2024-02-14 RX ADMIN — SODIUM CHLORIDE 100 ML/HR: 9 INJECTION, SOLUTION INTRAVENOUS at 08:35

## 2024-02-14 RX ADMIN — LIDOCAINE HYDROCHLORIDE 50 MG: 20 INJECTION, SOLUTION INFILTRATION; PERINEURAL at 10:10

## 2024-02-14 RX ADMIN — DEXAMETHASONE SODIUM PHOSPHATE 4 MG: 10 INJECTION INTRAMUSCULAR; INTRAVENOUS at 10:10

## 2024-02-14 RX ADMIN — Medication 200 MCG: at 10:39

## 2024-02-14 RX ADMIN — FENTANYL CITRATE 50 MCG: 50 INJECTION, SOLUTION INTRAMUSCULAR; INTRAVENOUS at 10:14

## 2024-02-14 SDOH — HEALTH STABILITY: MENTAL HEALTH: CURRENT SMOKER: 0

## 2024-02-14 ASSESSMENT — PAIN SCALES - GENERAL
PAINLEVEL_OUTOF10: 2
PAINLEVEL_OUTOF10: 0 - NO PAIN
PAIN_LEVEL: 0
PAINLEVEL_OUTOF10: 1

## 2024-02-14 ASSESSMENT — PAIN - FUNCTIONAL ASSESSMENT
PAIN_FUNCTIONAL_ASSESSMENT: 0-10

## 2024-02-14 ASSESSMENT — COLUMBIA-SUICIDE SEVERITY RATING SCALE - C-SSRS
6. HAVE YOU EVER DONE ANYTHING, STARTED TO DO ANYTHING, OR PREPARED TO DO ANYTHING TO END YOUR LIFE?: NO
2. HAVE YOU ACTUALLY HAD ANY THOUGHTS OF KILLING YOURSELF?: NO
1. IN THE PAST MONTH, HAVE YOU WISHED YOU WERE DEAD OR WISHED YOU COULD GO TO SLEEP AND NOT WAKE UP?: NO

## 2024-02-14 ASSESSMENT — PAIN DESCRIPTION - DESCRIPTORS: DESCRIPTORS: HEAVINESS

## 2024-02-14 ASSESSMENT — ENCOUNTER SYMPTOMS: JOINT SWELLING: 1

## 2024-02-14 NOTE — ANESTHESIA PREPROCEDURE EVALUATION
Patient: Harjit Angeles    Procedure Information       Date/Time: 02/14/24 1000    Procedure: ARTHROSCOPY KNEE MEDIAL MENISCECTOMY (Right: Knee)    Location: [facility] / [facility]    Surgeons: Juan Reaves MD            Relevant Problems   Cardiovascular   (+) Arteriosclerosis of coronary artery   (+) Moderate mixed hyperlipidemia not requiring statin therapy      Infectious Disease   (+) Onychomycosis       Clinical information reviewed:   Tobacco  Allergies  Meds   Med Hx  Surg Hx   Fam Hx  Soc Hx        NPO Detail:  NPO/Void Status  Carbohydrate Drink Given Prior to Surgery? : N  Date of Last Liquid: 02/14/24  Time of Last Liquid: 0000 (states 4oz)  Date of Last Solid: 02/13/24  Time of Last Solid: 2000  Last Intake Type: Clear fluids  Time of Last Void: 0700         Physical Exam    Airway  Mallampati: II  TM distance: >3 FB  Neck ROM: full     Cardiovascular    Dental - normal exam     Pulmonary    Abdominal        Anesthesia Plan    History of general anesthesia?: yes  History of complications of general anesthesia?: no    ASA 2     general     The patient is not a current smoker.    intravenous induction   Anesthetic plan and risks discussed with patient.

## 2024-02-14 NOTE — DISCHARGE INSTRUCTIONS
General Anesthesia Discharge Instructions    About this topic  You may need general anesthesia if you need to be asleep during a procedure. Your doctor will use drugs to block the signals that go from your nerves to your brain. Doctors give general anesthesia during a surgery or procedure to:  Allow you to sleep  Help your body be still  Relax your muscles  Help you to relax and be pain free  Keep you from remembering the surgery  Let the doctor manage your airway, breathing, and blood flow  The doctor or nurse anesthetist gives general anesthesia by a shot into your vein. Sometimes, you may breathe in a gas through a mask placed over your face.  What care is needed at home?  Ask your doctor what you need to do when you go home. Make sure you ask questions if you do not understand what the doctor says.  Your doctor may give you drugs to prevent or treat an upset stomach from the anesthetic. Take them as ordered.  If your throat is sore, suck on ice chips or popsicles to ease throat pain.  Put 2 to 3 pillows under your head and back when you lie down to help you breathe easier.  For the first 24 to 48 hours:  Do not operate heavy or dangerous machinery.  Do not make major decisions or sign important papers. You may not be able to think clearly.  Avoid beer, wine, or mixed drinks.  You are at a higher risk of falling for at least 24 hours after general anesthesia.  Take extra care when you get up.  Do not change positions quickly.  Do not rush when you need to go to the bathroom or to answer the phone.  Ask for help if you feel unsteady when you try to walk.  Wear shoes with non-slip soles and low heels.  What follow-up care is needed?  Your doctor may ask you to come back to the office to check on your progress. Be sure to keep these visits.  If you have stitches that do not dissolve or staples, you will need to have them removed. Your doctor will want to do this in 1 to 2 weeks. If the doctor used skin glue, the  glue will fall off on its own.  What drugs may be needed?  The doctor may order drugs to:  Help with pain  Treat an upset stomach or throwing up  Will physical activity be limited?  You will not be allowed to drive right away after the procedure. Ask a family member or a friend to drive you home.  Avoid trying to get out of bed without help until you are sure of your balance.  You may have to limit your activity. Talk to your doctor about if you need to limit how much you lift or limit exercise after your procedure.  What changes to diet are needed?  Start with a light diet when you are fully awake. This includes things that are easy to swallow like soups, pudding, jello, toast, and eggs. Slowly progress to your normal diet.  What problems could happen?  Low blood pressure  Breathing problems  Upset stomach or throwing up  Dizziness  Blood clots  Infection  When do I need to call the doctor?  Trouble breathing  Upset stomach or throwing up more than 3 times in the next 2 days  Dizziness  Teach Back: Helping You Understand  The Teach Back Method helps you understand the information we are giving you. After you talk with the staff, tell them in your own words what you learned. This helps to make sure the staff has described each thing clearly. It also helps to explain things that may have been confusing. Before going home, make sure you can do these:  I can tell you about my procedure.  I can tell you if I need to follow up with my doctor.  I can tell you what is good for me to eat and drink the next day.  I can tell you what I would do if I have trouble breathing, an upset stomach, or dizziness.  Where can I learn more?  [employer] [place] [employer]  https://www.nigms.nih.gov/education/pages/factsheet_Anesthesia.aspx  [facility] Choices  http://www.nhs.uk/conditions/Anaesthetic-general/Pages/Definition.aspx  Last Reviewed Date  2020-04-22    Dealing with Constipation from the Drugs You Take    About this  topic  Sometimes the drugs you take can cause constipation. This can also be called a side effect. Constipation is when your bowel movements are too hard, too small, hard to get out, or happen less than 3 times a week. Many kinds of drugs often cause problems with constipation. You may have problems with constipation if you take drugs to treat:  Pain  Low red blood cell count  Problems like Parkinson disease, bladder problems, or breathing problems  If you are on drugs to treat these conditions, you may need to find ways to prevent constipation. It is easier to prevent constipation than to treat it, after it starts.  General  Most of these drugs slow down how fast food moves through your stomach and bowels. This makes your stool harder and more difficult to pass. Also, your bowel movements may be small or you may have them less often. You may have stomach or back pain and feel bloated. You may feel like you cannot empty your bowels all the way.  Taking a laxative can help prevent problems with constipation. There are a few kinds of laxatives like:  Stool softener. This will help make your stool wetter and softer. A stool softener will not stimulate your bowel in any way.  Bulk adding. This helps your stool hold more water and makes your stool bigger. This also stimulates your bowels to pass stool.  Lubricant. This coats your bowel and stool to make it easier to pass. It also helps prevent water loss from your stool to your bowels.  Hyperosmotic or saline. This draws more water into the stool to make it wetter and softer.  Stimulant. This increases movement in your bowels.  Laxatives come in different forms. They are available in pills, powders, capsules, liquids, suppositories, and enemas. Talk with your doctor about the best laxative for you.  What drugs may be needed?  The doctor may order drugs to:  Help you move your bowels  Soften your stools  Will physical activity be limited?  Physical activity, like going  for a walk, may help get your bowels moving.  What changes to diet are needed?  Eat high fiber foods like whole grains, fruits, and vegetables.  Stay away from sugars and fats. Limit sweets and fatty foods, such as desserts, fried foods, and chips. Eat good fats found in fish, nuts, avocados, and oils like olive oil and canola oil. Cut back on solid fats (butter, lard, margarine).  What problems could happen?  Rectal bleeding  Hemorrhoids  Tears around the skin of the anus  Hard stool may pack the large bowels very tightly. If this happens, the normal pushing action of the bowels is not enough to remove the stool. This is called fecal impaction.  Loose, runny stools  What can be done to prevent this health problem?  Drink 6 to 8 glasses of water each day.  Set a regular time to pass stools. Do not ignore the urge to have a bowel movement. Give yourself plenty of time and privacy to have a bowel movement.  Sit in a warm bath to help you relax. This may help you feel like you need to have a bowel movement.  Exercise regularly.  When do I need to call the doctor?  Seek care right away or go to the ER if you have:  Lots of rectal bleeding  Sagging of the rectum  Very bad belly pain with hard stools, a fever of 100.4°F (38°C) or higher, and chills  Call your doctor if you have:  Change in bowel habits (hard stools alternating with loose stools)  Very bad pain in the anus during a bowel movement  Hemorrhoids  White or chalk-colored stools  Throwing up with constipation  Cracks or a tear in the lining of your anus  Hard stools for more than 2 weeks without help from home remedies or with belly pain  Last Reviewed Date  2021-08-13    Using Cold for Pain    About this topic  Cold is one of a few ways to help manage pain. Cold lowers your body temperature and lessens blood flow to the area. This lessens swelling and lowers activity in your nerves. This means you may have less pain signals to your brain.  Cold therapy can help  many problems like:  Arthritis  Tendinitis  Muscle and ligament sprains and strains  Muscle spasms  Headaches and migraines  Dermatitis  Delayed onset muscle soreness. This happens after a strenuous work out.  General  Here are ways you can use cold at home:  Ice packs - Place an ice pack or a bag of frozen peas wrapped in a towel over the sore part. Never put ice right on the skin. Do not leave the ice on more than 10 to 15 minutes at a time.  Ice massage - Move a small cup of ice or an ice cube back and forth over the sore part.  Coolant gels or sprays - These can be bought over the counter and applied on the skin around the sore part  Cold compress - Wet a washcloth with very cold water or ice water and then put it on the sore part.  Chemical cold packs - You can buy these single use packs in stores.  Ice bath - This is inserting your sore part into a container of icy water for a short time.  Cold therapy units - Often, you use these after you have an orthopedic surgery. You plug the unit in and fill it with ice water. Then you wrap a special sleeve around your sore part and the unit circulates cold water through the sleeve.  Do not use cold or talk to your doctor first if you:  Are pregnant  Have problems with sensation or feeling or the part is already numb  Have Raynaud syndrome  Have conditions like diabetes, heart problems, or blood pressure problems  Have trouble with blood vessel or blood clots  Have metal implants  Have skin problems like blisters, open sores, or dermatitis  You are very sensitive to cold  What problems could happen?  Frostbite  Nerve damage  Increased swelling  Numbness and tingling  Skin irritation  Ongoing pain  Helpful tips  Do not leave the ice on for more than 10 to 15 minutes at a time.  Try making your own ice pack at home. Mix one cup of rubbing alcohol with 2 cups of water and freeze it in a bag.  Use a bag of frozen vegetables to get ice around curvy body parts like shoulders  or ankles.  Call your doctor if the area under the cold pack has changed colors, has blisters, or swells.  Last Reviewed Date  2020-06-02  Going Up and Down Curbs or Stairs With a Walker or Crutches    About this topic  You may need to use a walker or crutches to move around after surgery or an injury. It can be hard to go up and down steps or curbs. Take extra care to do this safely. You may need help at first until you have time to practice using your walker or crutches. Keep these things in mind when using your walker or crutches:  Talk to your doctor about how much weight you can put on your leg. It is better to put less weight on your leg rather than more weight if you are not sure.  Make sure that you have the doctor or physical therapist adjust your walker or crutches so they are the right height for you and do not cause pain in your hands, arms, or armpits.  Do not cheat and put more weight on your leg than you are supposed to, even if you are feeling better. Putting too much weight down on your leg may slow the healing process and cause injury.  If you are having trouble keeping the right amount of weight on your leg, tell your doctor right away. Your doctor may need to send you to therapy or get another kind of device to help you move around.  Most of the time you will go up the steps with your strong leg first and go down the steps with your weak leg first. This is not true if you can't put any weight on your leg.  General  Going up a curb  Facing the curb:  Line your walker or crutches up close to the curb and get your balance.  Carefully, lift your walker or crutches up onto the curb. Make sure all 4 legs of the walker or both bottoms of crutches are on even ground. You may need a family member or friend to help you lift your walker onto the curb if you are using a walker.  Hold your walker with both hands, place the brake on your walker, and raise your strong leg up onto the curb. Then, bring your weak  leg up as well. You can then release the brake on your walker and move forward. If you have crutches, raise your strong leg up onto curb, pressing down on the crutches for balance and then bring up your weak leg up onto the curb.  Back to the curb:  Line your walker or crutches up close to the curb with your heel near the curb.  Push straight down on the walker or crutches with your hands and hop backwards up onto the curb with your strong leg.  Bring your weak leg up and get your balance.  Slowly, bring your walker or crutches up on the curb and make sure all 4 legs of the walker or the bottoms of the crutches are on even ground.  Going down a curb:  Line your walker or crutches up close to the curb and get your balance.  Carefully, lift your walker down onto the ground. You may need a family member or friend to help you place your walker down on the ground. Make sure all 4 legs of the walker are on even ground and apply the brake to your walker. If you have crutches, place the crutches on the ground.  Hold your walker or crutches with both hands.  If you are allowed to put weight on your injured leg, lower that one first.  If not, place the injured leg out in front and carefully lower your strong leg to the ground.  Going up steps that have a railing:  Fold up your walker. Grab the rail with one hand.  Put the walker two steps up from the ground. The folded walker should be just far enough away from the edge to be securely on the step.  Slowly, bring your strong leg onto the step in front of you. Next bring up your weak leg.  Move the walker up to the next step and repeat until you get to the top of the steps.  If you are using crutches, grab the rail with one hand and place both crutches under your armpit on your other arm.  As you climb stairs, raise your strong foot first and then bring up your weak foot.  Going down steps with a railing:  Fold up your walker. Grab the rail with one hand.  Put the walker two  "steps below you. The folded walker should be up against the back of the step.  If you are allowed to put weight on your weak leg, slowly lower it onto the step in front of you. Then, bring down your strong leg.  If not, place your strong leg down on the step, holding your weak leg out in front of you.  Move the walker down to the next step and repeat until you get to the bottom of the steps.  If you are using crutches, grab the rail with one hand and place both crutches under your armpit on your other arm.  As you go down the stairs, lower your weak leg first and then bring down your strong leg. Repeat with each step until you reach the bottom of the stairs.  Scooting up or down the steps on your bottom  Sometimes, this is the safest way to get up and down the steps. This may be the case if you have trouble with balance or are not allowed to put any weight on your leg.  You will need a helper to bring your crutches or walker up and down the stairs if you do it this way.  Scooting up the steps:  Sit down on the second step.  Put your hands up on the step behind you.  Bend your STRONG leg and put it on the step below you.  Push yourself up to the next step, keeping your injured leg up.  Repeat until you are at the top of the steps.  Scooting down the steps:  Sit down at the top of the steps and reach your STRONG leg down two steps below you.  Put your hands on the same step on which you are sitting.  Keep your injured leg up in front of you.  Slowly, lower yourself down to the next step.  Repeat until you reach the second step from the bottom.  Stand up.  Helpful tips  Have family or friends help you to check your living area and remove or put away any rugs, cords, or furniture that may catch on your walker or crutches and cause you to fall.  As a rule when going up and down steps, \"Strong leg goes up first\" and \"Weak leg goes down first.\" The exception is when you can't put any weight on your leg or if you are using " a walker. Do not try to climb stairs with your walker open.  Last Reviewed Date  2021-01-27  How to Use Crutches    About this topic  Crutches are tools to help you walk or move from place to place. They are most often used for balance during walking or to take weight off of your leg after an injury. Keep these things in mind when choosing crutches:  Crutches must fit right to give you the most help.  There should be at least 1 to 11/2 inches (2.5 to 4 cm) of space between the armpit and crutch pad.  Your arm should be slightly bent when you hold the handgrip.  It is important to put your weight on your hands. Your armpit should not touch the top of the crutch.  Look forward when you are walking, not down at your feet. Keep the tips of your crutches about 4 to 6 inches (10 to 15 cm) away from your feet so that you do not trip.  General  Make sure that you talk to your doctor about how much weight you can put on your sore leg. It is important to follow these instructions in order to recover faster.  How to walk using crutches:  Stand up straight with feet apart.  Hold your crutches firmly. They should be about 6 to 8 inches (15 to 20 cm) from your side. Your weight should be on your leg that is not hurt.  Lean forward slightly and move both the crutches about 6 to 12 (15 to 30 cm) inches or one step length forward.  Keep your elbows slightly bent.  Put your weight on your hands. Move your body and your hurt leg towards the crutches.  Finish the step normally with your leg that is not hurt. Keep the hurt leg off the floor. You may bend your knee or put your leg in front of you.  Repeat the motions.  Do not put your weight on your hurt leg or foot unless the doctor says it is OK.  Take time to rest if you are feeling tired.  How to sit down with crutches:  Back up to the chair until you feel the edge against the back of your legs.  Place both crutches in one hand. Use the hand on the same side as your hurt leg or  foot.  Use your other hand to feel for the seat and slowly lower yourself to the chair.  How to stand up with crutches:  Move to the edge of the chair.  Place both of the crutches in one hand. Use the hand on the same side as your hurt leg or foot.  Push yourself up by using the armrest of the chair or the crutches.  Put your weight on your leg that is not hurt. Keep the hurt leg off the floor. You may bend your knee or put your leg in front of you.  Place the crutches in each hand.  How to go up the stairs with crutches:  Face the stairs, near the railing.  Place the crutches close to the first step.  Put your weight on your hands and lift the foot of your leg that is not hurt to the first step.  Bring the crutches and your hurt leg onto the step at the same time.  Repeat the motion.  You may want someone to help the first time. You can also place both crutches in one hand and hold on to the handrail of the stairs. It may help give you balance.  How to go down the stairs with crutches:  Put your crutches in the middle of the step below you.  Slowly, move your body towards the crutches. Hold your crutches steady.  Step down using your leg or foot that is not hurt.  Repeat the motion.  You can also place both crutches in one hand and hold onto the handrail of the stairs. It may help give you balance.  How to get in and out of the car with crutches:  Open the car door all the way.  Back up to the seat using your crutches. Keep going until you feel the back of your legs touch the seat.  Reach back for the seat, dashboard, or handle. Do NOT hold on to the door. It could move and cause a fall or injury. Make sure you reach back with both hands. Do not hold on to your walker or crutches when lowering yourself to sit down.  Keep your sore leg out in front of you. Slowly, lower yourself to the seat.  Bring your legs into the car one at a time. You may need someone to help you. If you are in the back seat, you can scoot to  the other side of the car leaving your leg supported on the seat.  When getting out of the car, reverse the steps. Be sure to push off of the seat, dashboard, or handle to help you stand up. Then, grab onto your crutches or walker. Do NOT use the door to pull yourself up.  Helpful tips:  It is most often easier to get into a medium-sized four door car. It may be harder to get in vehicles that are too high, such as minivans or SUV's, or too low.  If you are getting into the front seat, make sure to slide the front seat all the way back before you get in. This will give you more room for your legs.  If you are getting into the back seat of the car, it is often easier to get in on the same side of your sore leg. If your left leg is injured, it is easier to get in on the 's side. If your right leg is injured, it is easier to get in on the passenger side.  If you have trouble scooting on the seat, a plastic trash bag on the seat may help you to slide.    When do I need to call the doctor?  You have problems walking, even with the use of crutches.  Helpful tips  Remove any loose rugs, cords, spills, or anything that is in your way.  Keep the floor dry. Do not walk on slippery surfaces.  Do not use your crutches if you are feeling dizzy.  Make sure that your footwear has rubber soles.  Check the rubber tips of your crutches for any damage.  Use a bag or backpack to carry your things while you are walking.  Use nonskid bath mats.  Simplify your home. Keep things within easy reach.  Last Reviewed Date  2021-02-12    Knee Arthroscopy Discharge Instructions    About this topic  Knee arthroscopy is a type of surgery to check for and treat knee joint problems. The knee is a large and complex joint. It is made up of 4 bones: the thigh bone, two lower leg bones, and the kneecap. Your kneecap is also called your patella. A smooth tissue called cartilage lines the ends of the bones. This helps the joint glide easier. With  normal wear and tear or other problems, the cartilage can wear down. Then the joint can become damaged. This can lead to pain and loss of motion.  Ligaments join one bone to another. The knee joint has 2 side ligaments. These are the medial and lateral collateral ligaments. The anterior and posterior cruciate ligaments are in the middle of the knee. Sometimes, ligaments get damaged or strained in some way. Ligaments can get small tears or they can tear all the way. This may cause a problem with normal knee joint movement and make the knee unstable.  With knee arthroscopy, the doctor makes a few small cuts and uses special tools to look inside of your knee.    What care is needed at home?  Ask your doctor what you need to do when you go home. Make sure you understand everything the doctor says. This way you will know what you need to do.  Place an ice pack or a bag of frozen peas wrapped in a towel on your knee. Never put ice right on the skin. Do not leave the ice on more than 10 to 15 minutes at a time. You may do this several times a day to help lessen pain and swelling.  You may need to wear a brace to support your knee or to limit movement. Ask your doctor how long you need to wear the brace.  You may also need to use crutches or a walker to get around. Talk to the doctor about how much weight you can put on your leg. Depending on your surgery, you may not be allowed to put any weight on it at all. Other times, you will be able to put some weight on your leg or as much weight as tolerated. It is very important to follow these instructions to allow for proper healing.  Talk to your doctor about how to care for your cut site. Ask your doctor about:  When you should change your bandages  When you may take a shower  If you need to be careful with lifting things over 10 pounds (4.5 kg)  When you may go back to your normal activities like work or driving  When you may soak your cuts in water, such as in a pool or  bathtub  Do any exercises that your doctor shows you to do. Your doctor will probably send you to physical therapy very soon after surgery. It is important to do these exercises to get full recovery of your knee.  What follow-up care is needed?  Your doctor may ask you to make visits to the office to check on your progress. Be sure to keep these visits.  If you have stitches, you may need to have them taken out. Your doctor will often want to do this in 1 to 2 weeks.  You may also need to see a physical therapist (PT). The PT will teach you exercises to help you get back your strength and motion.  What drugs may be needed?  Your doctor may order drugs to:  Help with pain and swelling  Prevent infection  Prevent blood clots  Will physical activity be limited?  You may need to rest your knee for a while. You should not do physical activity that makes your health problem worse. If you run, work out, or play sports, you may not be able to do those things until your health problem gets better.  What problems could happen?  Infection  Bleeding  Damage to blood vessels, nerves, or other parts of your knee  Blood clots  Ongoing pain and stiffness  Unable to fix the problem with arthroscopy and need to do an open surgery  What can be done to prevent this health problem?  To prevent injuring to your knee again:  Return slowly to your regular activities. Doing too much too soon may delay healing or hurt your knee again.  Avoid or use caution when playing sports that will put a lot of strain on your knee like running, twisting, or fast starts and stops. Ask your doctor what is safe to do.  After going through a rehab program, your doctor may recommend that you continue doing activities that will strengthen your knee.  Stay active and work out to keep your muscles strong and flexible.  Always warm-up slowly and stretch your knee before you exercise.  When do I need to call the doctor?  Signs of infection. These include a fever  "of 100.4°F (38°C) or higher, chills, wound that will not heal.  Signs of wound infection. These include swelling, redness, warmth around the wound; too much pain when touched; yellowish, greenish, or bloody discharge; foul smell coming from the cut site; cut site opens up.  Increased numbness or tingling in the leg and foot  Foot feels cold  You are not feeling better in 2 to 3 days or you are feeling worse  Signs of blood clot; sudden pain in calf, shortness of breath  Teach Back: Helping You Understand  The Teach Back Method helps you understand the information we are giving you. After you talk with the staff, tell them in your own words what you learned. This helps to make sure the staff has described each thing clearly. It also helps to explain things that may have been confusing. Before going home, make sure you can do these:  I can tell you about my condition and how to prevent it.  I can tell you how to care for my cut site.  I can tell you what I will do if I have a fever, chills, or my wound will not heal.  Last Reviewed Date  2020-04-22  Stitches and staples    The Basics  Written by the doctors and editors at [facility]  What are stitches? -- Stitches are a way doctors can close certain types of cuts. A doctor uses a special needle and thread to put in stitches. They sew the edges of the cut together and tie knots to hold the stitches in place (figure 1). The term doctors use for stitches is \"sutures.\"  There are 2 main types of stitches:  ?Absorbable - These stitches dissolve over time. They do not need to be taken out.  ?Non-absorbable - These stitches need to be taken out after a certain amount of time. They do not dissolve.  In some cases, like if you have a very deep cut, your doctor might give you stitches plus something called \"skin glue\" or \"tissue adhesive.\"  What are staples? -- Another way doctors can close cuts is with staples. Staples that go in the body are different from those used on paper. " To put staples in, doctors use a special stapler (figure 2). Staples need to be taken out after a certain amount of time, just like non-absorbable stitches.  How do I know if I need stitches or staples? -- A doctor or nurse will have to look at your cut to decide. In general, you will need stitches or staples if your cut is wide, jagged, or goes deep enough through your skin. A cut will heal on its own without stitches or staples, but they help a cut heal faster and leave less of a scar.  Minor cuts and scrapes that do not go very deep usually do not need stitches. If you get a cut and don't know if you need stitches, check with your doctor or nurse.  What happens when I get stitches or staples? -- Before the doctor stitches or staples your cut, they will clean out the cut well. They will also give you numbing medicine so that you don't feel pain when the stitches or staples go in.  After the doctor stitches or staples your cut, they will cover the area with gauze or a bandage.  Why is it important to take care of my stitches or staples? -- It's important to take care of your stitches or staples so that your cut heals well and doesn't get infected.  How do I take care of my stitches or staples? -- Your doctor or nurse will give you specific instructions, depending on the type of stitches you have and where they are. Staples need the same type of care as non-absorbable stitches.  Here is some general advice:  ?Keep your stitches or staples dry and covered with a bandage. Non-absorbable stitches and staples need to be kept dry for 1 to 2 days. Absorbable stitches sometimes need to be kept dry longer. Your doctor or nurse will tell you exactly how long to keep your stitches dry.  ?Once you no longer need to keep your stitches or staples dry, gently wash them with soap and water whenever you take a shower. Do not put your stitches or staples underwater, such as in a bath, pool, or lake. This can slow down healing and  raise your chance of getting an infection.  ?After you wash your stitches or staples, pat them dry and put an antibiotic ointment on them  ?Cover your stitches or staples with a bandage or gauze, unless your doctor or nurse tells you not to  ?Avoid activities or sports that could hurt the area of your stitches or staples for 1 to 2 weeks. (Your doctor or nurse will tell you exactly how long to avoid these activities.) If you hurt the same part of your body again, stitches can break, and the cut can open up again.  When should I call the doctor or nurse? -- Call your doctor or nurse if:  ?Your stitches break or the cut opens up again  ?You get a fever  ?You have redness or swelling around the cut, or pus drains from the cut. It is normal for clear yellow fluid to drain from the cut in the first few days.  When will my stitches or staples be taken out? -- The doctor who puts in the stitches or staples will tell you when to see your doctor or nurse to have them taken out. Non-absorbable stitches usually stay in for 5 to 14 days, depending on where they are. Staples usually stay in for 7 to 10 days.  Staples need to be taken out with a special staple remover. But doctors' offices don't always have this device. The doctor who puts in your staples might give you a staple remover. If so, bring it to your doctor's office when you have your staples taken out.  What should I do after my stitches or staples are out? -- After your stitches or staples are out, you should protect the scar from the sun. Use sunscreen on the area or wear clothes or a hat that covers the scar.  Your doctor or nurse might also recommend that you use certain lotions or creams to help your scar heal.  All topics are updated as new evidence becomes available and our peer review process is complete.  This topic retrieved from [employer] on: Oct 19, 2023.

## 2024-02-14 NOTE — ANESTHESIA PROCEDURE NOTES
Airway  Date/Time: 2/14/2024 10:12 AM  Urgency: elective    Airway not difficult    Staffing  Performed: attending   Authorized by: Umang Lino MD    Performed by: Umang Lino MD  Patient location during procedure: OR    Indications and Patient Condition  Indications for airway management: anesthesia  Spontaneous Ventilation: absent  Sedation level: deep  Preoxygenated: yes  Patient position: sniffing  MILS maintained throughout  Mask difficulty assessment: 0 - not attempted    Final Airway Details  Final airway type: supraglottic airway      Successful airway: classic  Size 4     Number of attempts at approach: 1  Ventilation between attempts: none

## 2024-02-14 NOTE — H&P
"History Of Present Illness  Harjit Angeles is a 63 y.o. male presenting with right knee medial meniscus tear elected proceed with surgery for right knee arthroscopy at the meniscectomy.     Past Medical History  He has a past medical history of Asthma, COVID-19, Hyperlipidemia, and Nail fungus.    Surgical History  He has a past surgical history that includes Other surgical history (02/15/2021); Other surgical history (02/15/2021); and Hernia repair.     Social History  He reports that he has never smoked. He has never used smokeless tobacco. He reports current alcohol use. He reports that he does not use drugs.    Family History  Family History   Problem Relation Name Age of Onset    Hypertension Mother      Diabetes Father      Other (Myocardial infarction) Father      Hypertension Sister          Allergies  Patient has no known allergies.    Review of Systems   Musculoskeletal:  Positive for gait problem and joint swelling.   All other systems reviewed and are negative.       Physical Exam  Constitutional:       Appearance: Normal appearance.   HENT:      Head: Atraumatic.   Eyes:      Extraocular Movements: Extraocular movements intact.   Cardiovascular:      Rate and Rhythm: Normal rate.   Pulmonary:      Effort: Pulmonary effort is normal.   Abdominal:      General: Abdomen is flat.   Musculoskeletal:      Cervical back: Normal range of motion.   Skin:     General: Skin is warm and dry.   Neurological:      General: No focal deficit present.      Mental Status: He is alert.   Psychiatric:         Mood and Affect: Mood normal.          Last Recorded Vitals  Blood pressure 139/79, pulse 52, temperature 36.4 °C (97.5 °F), temperature source Temporal, resp. rate 16, height 1.702 m (5' 7\"), weight 66.5 kg (146 lb 9.7 oz), SpO2 99 %.    Relevant Results      Scheduled medications  ceFAZolin, 2 g, intravenous, q8h      Continuous medications  sodium chloride 0.9%, 100 mL/hr, Last Rate: 100 mL/hr (02/14/24 0835)      PRN " medications    No results found for this or any previous visit (from the past 24 hour(s)).    Assessment/Plan   Principal Problem:    Other tear of medial meniscus, current injury, right knee, initial encounter      Right knee medial meniscus tear     Right knee arthroscopy with partial medial menisectomy plan of care:  Risks benefits and alternatives to surgery were discussed including but not limited to Infection, bleeding, neurovascular injury, pain and dysfunction, hardware related complications including cutout failure breakage, loss of function, motion, and permanent disability as well as the cardiovascular and pulmonary complications from anesthesia including death and DVT. Patient and family accept these risks.  We discussed specifically post meniscectomy pain, incomplete pain relief, meniscus retear, progressive arthritis, intraoperative decisions in regards to other pathology, and the potential for future revision surgeries including arthroplasty    Plan for outpatient surgery   1. 1 week postop follow up   2. Percocet for postop pain relief. OARRS has been reviewed and is consistent with prescribed medications. This report is scanned into the electronic medical record. The risks of abuse, dependence, addiction and diversion were considered. The medication is felt to be clinically appropriate based on documented diagnosis .  3.  Pre-CERT for removal postoperative knee brace    Juan Reaves MD

## 2024-02-14 NOTE — ANESTHESIA POSTPROCEDURE EVALUATION
Patient: Harjit Angeles    Procedure Summary       Date: 02/14/24 Room / Location: [facility] OR 04 / [facility]    Anesthesia Start: 1006 Anesthesia Stop:     Procedure: ARTHROSCOPY KNEE MEDIAL MENISCECTOMY (Right: Knee) Diagnosis:       Other tear of medial meniscus, current injury, right knee, initial encounter      (Other tear of medial meniscus, current injury, right knee, initial encounter [S83.647J])    Surgeons: Juan Reaves MD Responsible Provider: Umang Lino MD    Anesthesia Type: MAC ASA Status: 2            Anesthesia Type: MAC    Vitals Value Taken Time   /71 02/14/24 1106   Temp 36.1 02/14/24 1106   Pulse 47 02/14/24 1106   Resp 12 02/14/24 1106   SpO2 100% 02/14/24 1106       Anesthesia Post Evaluation    Patient location during evaluation: bedside  Patient participation: complete - patient participated  Level of consciousness: awake and alert  Pain score: 0  Pain management: adequate  Multimodal analgesia pain management approach  Airway patency: patent  Cardiovascular status: acceptable  Respiratory status: acceptable and nasal cannula  Hydration status: acceptable  Postoperative Nausea and Vomiting: none        No notable events documented.

## 2024-02-14 NOTE — OP NOTE
ARTHROSCOPY KNEE MEDIAL MENISCECTOMY (R) Operative Note     Date: 2024  OR Location: [facility]    Name: Harjit Angeles, : 1960, Age: 63 y.o., MRN: [id number], Sex: male    Diagnosis  Pre-op Diagnosis     * Other tear of medial meniscus, current injury, right knee, initial encounter [S83.989A] Post-op Diagnosis     * Other tear of medial meniscus, current injury, right knee, initial encounter [S83.077A]     Procedures  ARTHROSCOPY KNEE MEDIAL MENISCECTOMY  [id number] - GA ARTHRS KNE SURG W/MENISCECTOMY MED/LAT W/SHVG      Surgeons      * Juan Reaves - Primary    Resident/Fellow/Other Assistant:  Surgeon(s) and Role: Juice Crum    Procedure Summary  Anesthesia: General  ASA: II  Anesthesia Staff: Anesthesiologist: Umang Lino MD  Estimated Blood Loss: Minimal mL  Intra-op Medications:   Administrations occurring from 1000 to 1045 on 24:   Medication Name Total Dose   sodium chloride 0.9 % irrigation solution 3,000 mL   BUPivacaine-EPINEPHrine (Marcaine w/EPI) 0.25 %-1:200,000 injection 30 mL   ceFAZolin in dextrose (iso-os) (Ancef) IVPB 2 g 2 g              Anesthesia Record               Intraprocedure I/O Totals       None           Specimen: No specimens collected     Staff:   Circulator: Debby Watters RN  Scrub Person: Christal Campos         Drains and/or Catheters: * None in log *    Tourniquet Times:         Implants:   Indications: 63-year-old male injured his right knee resulting in associated meniscus tear elected proceed surgery for right knee arthroscopy meniscectomy    Risks benefits and alternatives to surgery were discussed including but not limited to Infection, bleeding, neurovascular injury, pain and dysfunction, hardware related complications including cutout failure breakage, loss of function, motion, and permanent disability as well as the cardiovascular and pulmonary complications from anesthesia including death and DVT. Patient and family accept these risks.  We discussed specifically  post meniscectomy pain, incomplete pain relief, meniscus retear, progressive arthritis, intraoperative decisions in regards to other pathology, and the potential for future revision surgeries including arthroplasty    Patient identified in the preop area.  Right lower extremity marked and confirmed with patient as the operative site.  Brought back to the operating room and anesthetized under general anesthesia.  Operative lower extremity was then prepped and draped in standard sterile fashion.  Patient, site and procedure were confirmed with timeout.  Everyone in the room agreed.  Preop antibiotics were given.    We then made standard medial and lateral arthroscopy portal and the scope was introduced.  Medial joint space : Patient notable tear of the mid body/posterior horn of the medial meniscus.  A flap and a small radial component which is probably the inferior leaflet.  Posterior leaflet near the root attachment was intact along the meniscocapsular junction.  Resected about 15 to 20% of the meniscus volume back to a stable base.  Some diffuse cartilage change grade 2 and 3 with medial femoral condyle minimal chondroplasty no obvious loose flaps.  ACL and notch were normal, little bit of fraying in the anterior aspect of the ACL.  Lateral joint space: Normal meniscus and cartilage  Patellofemoral joint space: Normal patellofemoral cartilage with mild softening and normal mechanics    Wounds were irrigated with normal saline.  Portals were closed with standard fashion.  Sterile dressings were applied.  Patient was then awoken from general anesthesia and sent to the PACU in stable condition.    Juice Crum PA-C was present throughout the entire case. Given the nature of the disease process and the procedure, a skilled surgical first assistant was necessary during the case. The assistant was necessary to hold retractors and to manipulate the extremity during the procedure. A certified scrub tech was at the back  table managing the instruments and supplies for the surgical case.  Juan Reaves  Phone Number: [phone]

## 2024-02-19 ENCOUNTER — LAB (OUTPATIENT)
Dept: LAB | Facility: LAB | Age: 64
End: 2024-02-19
Payer: COMMERCIAL

## 2024-02-19 ENCOUNTER — OFFICE VISIT (OUTPATIENT)
Dept: PRIMARY CARE | Facility: CLINIC | Age: 64
End: 2024-02-19
Payer: COMMERCIAL

## 2024-02-19 VITALS
TEMPERATURE: 97.9 F | HEART RATE: 83 BPM | HEIGHT: 67 IN | DIASTOLIC BLOOD PRESSURE: 76 MMHG | OXYGEN SATURATION: 98 % | BODY MASS INDEX: 23.67 KG/M2 | SYSTOLIC BLOOD PRESSURE: 124 MMHG | WEIGHT: 150.8 LBS

## 2024-02-19 DIAGNOSIS — E78.5 DYSLIPIDEMIA: ICD-10-CM

## 2024-02-19 DIAGNOSIS — R73.9 ELEVATED BLOOD SUGAR: ICD-10-CM

## 2024-02-19 DIAGNOSIS — E55.9 VITAMIN D DEFICIENCY: ICD-10-CM

## 2024-02-19 DIAGNOSIS — Z23 NEED FOR SHINGLES VACCINE: ICD-10-CM

## 2024-02-19 DIAGNOSIS — E78.5 DYSLIPIDEMIA: Primary | ICD-10-CM

## 2024-02-19 DIAGNOSIS — J02.9 PHARYNGITIS, UNSPECIFIED ETIOLOGY: ICD-10-CM

## 2024-02-19 DIAGNOSIS — J06.9 VIRAL UPPER RESPIRATORY TRACT INFECTION: ICD-10-CM

## 2024-02-19 DIAGNOSIS — Z00.00 WELL ADULT EXAM: Primary | ICD-10-CM

## 2024-02-19 PROBLEM — E78.2 MODERATE MIXED HYPERLIPIDEMIA NOT REQUIRING STATIN THERAPY: Status: RESOLVED | Noted: 2024-02-14 | Resolved: 2024-02-19

## 2024-02-19 LAB
25(OH)D3 SERPL-MCNC: 30 NG/ML (ref 30–100)
ALBUMIN SERPL BCP-MCNC: 4.2 G/DL (ref 3.4–5)
ALP SERPL-CCNC: 53 U/L (ref 33–136)
ALT SERPL W P-5'-P-CCNC: 13 U/L (ref 10–52)
ANION GAP SERPL CALC-SCNC: 12 MMOL/L (ref 10–20)
AST SERPL W P-5'-P-CCNC: 16 U/L (ref 9–39)
BILIRUB DIRECT SERPL-MCNC: 0.1 MG/DL (ref 0–0.3)
BILIRUB SERPL-MCNC: 0.4 MG/DL (ref 0–1.2)
BUN SERPL-MCNC: 15 MG/DL (ref 6–23)
CALCIUM SERPL-MCNC: 9.3 MG/DL (ref 8.6–10.3)
CHLORIDE SERPL-SCNC: 104 MMOL/L (ref 98–107)
CHOLEST SERPL-MCNC: 130 MG/DL (ref 0–199)
CHOLESTEROL/HDL RATIO: 2
CO2 SERPL-SCNC: 31 MMOL/L (ref 21–32)
CREAT SERPL-MCNC: 1.18 MG/DL (ref 0.5–1.3)
EGFRCR SERPLBLD CKD-EPI 2021: 70 ML/MIN/1.73M*2
EST. AVERAGE GLUCOSE BLD GHB EST-MCNC: 105 MG/DL
GLUCOSE SERPL-MCNC: 91 MG/DL (ref 74–99)
HBA1C MFR BLD: 5.3 %
HDLC SERPL-MCNC: 63.6 MG/DL
LDLC SERPL CALC-MCNC: 47 MG/DL
NON HDL CHOLESTEROL: 66 MG/DL (ref 0–149)
POC RAPID STREP: NEGATIVE
POTASSIUM SERPL-SCNC: 4.6 MMOL/L (ref 3.5–5.3)
PROT SERPL-MCNC: 6.5 G/DL (ref 6.4–8.2)
SODIUM SERPL-SCNC: 142 MMOL/L (ref 136–145)
TRIGL SERPL-MCNC: 95 MG/DL (ref 0–149)
TSH SERPL-ACNC: 1.34 MIU/L (ref 0.44–3.98)
VIT B12 SERPL-MCNC: 277 PG/ML (ref 211–911)
VLDL: 19 MG/DL (ref 0–40)

## 2024-02-19 PROCEDURE — 87636 SARSCOV2 & INF A&B AMP PRB: CPT

## 2024-02-19 PROCEDURE — 83036 HEMOGLOBIN GLYCOSYLATED A1C: CPT

## 2024-02-19 PROCEDURE — 80061 LIPID PANEL: CPT

## 2024-02-19 PROCEDURE — 87634 RSV DNA/RNA AMP PROBE: CPT

## 2024-02-19 PROCEDURE — 99396 PREV VISIT EST AGE 40-64: CPT | Performed by: INTERNAL MEDICINE

## 2024-02-19 PROCEDURE — 87880 STREP A ASSAY W/OPTIC: CPT | Performed by: INTERNAL MEDICINE

## 2024-02-19 PROCEDURE — 84443 ASSAY THYROID STIM HORMONE: CPT

## 2024-02-19 PROCEDURE — 82248 BILIRUBIN DIRECT: CPT

## 2024-02-19 PROCEDURE — 1036F TOBACCO NON-USER: CPT | Performed by: INTERNAL MEDICINE

## 2024-02-19 PROCEDURE — 99213 OFFICE O/P EST LOW 20 MIN: CPT | Performed by: INTERNAL MEDICINE

## 2024-02-19 PROCEDURE — 87081 CULTURE SCREEN ONLY: CPT

## 2024-02-19 PROCEDURE — 82306 VITAMIN D 25 HYDROXY: CPT

## 2024-02-19 PROCEDURE — 80053 COMPREHEN METABOLIC PANEL: CPT

## 2024-02-19 PROCEDURE — 82607 VITAMIN B-12: CPT

## 2024-02-19 PROCEDURE — 36415 COLL VENOUS BLD VENIPUNCTURE: CPT

## 2024-02-19 RX ORDER — ATORVASTATIN CALCIUM 20 MG/1
20 TABLET, FILM COATED ORAL NIGHTLY
Qty: 90 TABLET | Refills: 3 | Status: SHIPPED | OUTPATIENT
Start: 2024-02-19

## 2024-02-19 RX ORDER — EPINEPHRINE 0.22MG
100 AEROSOL WITH ADAPTER (ML) INHALATION DAILY
Qty: 30 CAPSULE | Refills: 11
Start: 2024-02-19 | End: 2025-02-18

## 2024-02-19 ASSESSMENT — PATIENT HEALTH QUESTIONNAIRE - PHQ9
2. FEELING DOWN, DEPRESSED OR HOPELESS: NOT AT ALL
SUM OF ALL RESPONSES TO PHQ9 QUESTIONS 1 AND 2: 0
1. LITTLE INTEREST OR PLEASURE IN DOING THINGS: NOT AT ALL

## 2024-02-19 NOTE — PROGRESS NOTES
"Subjective       Current Issues:  Current concerns include current st, rn cough  No fever  No sob .  Sleep: all night  No bowel or bladder issues  No cp or sob or depression    Review of Nutrition:  Current diet: good   Exercise discussed    Gen:  no fever  HEENT:  no trouble swallowing  CV:  no dyspnea, cyanosis  Lungs:  no shortness of breath  GI:  no constipation, no blood in stool  Vascular:  no edema  Neuro:   no weakness  Skin:  no rash  MS:no joint swelling  Gu:  no urinary complaints  All other systems have been reviewed and are negative for complaint      Screening Questions:  Objective   /76   Pulse 83   Temp 36.6 °C (97.9 °F) (Temporal)   Ht 1.702 m (5' 7\")   Wt 68.4 kg (150 lb 12.8 oz)   SpO2 98%   BMI 23.62 kg/m²       General:   alert and oriented, in no acute distress   Gait:   normal   Skin:   normal   Oral cavity:   lips, mucosa, and tongue normal; teeth and gums normal   Eyes:   sclerae white, pupils equal and reactive ,mild red pharynx    Ears:   normal bilaterally Tms grey   Neck:   no adenopathy and thyroid not enlarged, symmetric, no tenderness/mass/nodules   Lungs:  clear to auscultation bilaterally   Heart:   regular rate and rhythm, S1, S2 normal, no murmur, click, rub or gallop   Abdomen:  soft, non-tender; bowel sounds normal; no masses, no organomegaly   : ne       Extremities:  extremities normal, warm and well-perfused; no cyanosis, clubbing, or edema,   Neuro:  normal without focal findings and muscle tone and strength normal and symmetric       Harjit was seen today for primary care physical.  Diagnoses and all orders for this visit:  Well adult exam (Primary)  Dyslipidemia  -     TSH with reflex to Free T4 if abnormal; Future  -     Vitamin D 25-Hydroxy,Total (for eval of Vitamin D levels); Future  -     Hemoglobin A1C; Future  -     Vitamin B12; Future  -     Lipid Panel; Future  -     Basic Metabolic Panel; Future  -     atorvastatin (Lipitor) 20 mg tablet; Take 1 " tablet (20 mg) by mouth once daily at bedtime.  -     coenzyme Q-10 (Co Q-10) 100 mg capsule; Take 1 capsule (100 mg) by mouth once daily.  Vitamin D deficiency  -     TSH with reflex to Free T4 if abnormal; Future  -     Vitamin D 25-Hydroxy,Total (for eval of Vitamin D levels); Future  -     Hemoglobin A1C; Future  -     Vitamin B12; Future  -     Lipid Panel; Future  -     Basic Metabolic Panel; Future  Elevated blood sugar  -     Hemoglobin A1C; Future  -     Basic Metabolic Panel; Future  Need for shingles vaccine  -     Cancel: Zoster vaccine, recombinant, adult (SHINGRIX)  Pharyngitis, unspecified etiology  -     RSV PCR  -     Influenza A, and B PCR  -     Sars-CoV-2 PCR  -     POCT rapid strep A manually resulted  -     Group A Streptococcus, Culture  Viral upper respiratory tract infection  Comments:  sx care discussed  fu if not resolved    Pt to do shingrix at pharmacy      Chronic conditions reviewed in the assessment and plan.    Continue medications unless specified otherwise.  Previous labs reviewed.   Other specialty provider notes reviewed.   Fu in one year for well care and as otherwise stated in wrap up plans.

## 2024-02-20 LAB
FLUAV RNA RESP QL NAA+PROBE: NOT DETECTED
FLUBV RNA RESP QL NAA+PROBE: NOT DETECTED
RSV RNA RESP QL NAA+PROBE: NOT DETECTED
SARS-COV-2 RNA RESP QL NAA+PROBE: NOT DETECTED

## 2024-02-21 DIAGNOSIS — E55.9 VITAMIN D DEFICIENCY: ICD-10-CM

## 2024-02-21 LAB — S PYO THROAT QL CULT: NORMAL

## 2024-02-23 ENCOUNTER — OFFICE VISIT (OUTPATIENT)
Dept: ORTHOPEDIC SURGERY | Facility: CLINIC | Age: 64
End: 2024-02-23
Payer: COMMERCIAL

## 2024-02-23 DIAGNOSIS — S83.231D COMPLEX TEAR OF MEDIAL MENISCUS OF RIGHT KNEE AS CURRENT INJURY, SUBSEQUENT ENCOUNTER: Primary | ICD-10-CM

## 2024-02-23 PROCEDURE — 99024 POSTOP FOLLOW-UP VISIT: CPT | Performed by: ORTHOPAEDIC SURGERY

## 2024-02-23 PROCEDURE — 1036F TOBACCO NON-USER: CPT | Performed by: ORTHOPAEDIC SURGERY

## 2024-02-23 NOTE — PROGRESS NOTES
History of Present Illness  Patient returns today noting minimal pain.  Denies any calf pain or shortness of breath.     Exam  Mild effusion  Healthy incisions - no active drainage  Good range of motion  No calf swelling  Negative Alexander´s test  Distal neurovascular exam intact     Operative Findings  Flap tear medial meniscus     Assessment  Patient status post right knee arthroscopy partial medial meniscectomy      Plan  Reviewed arthroscopic photos and findings.  Discussed short and long term implications for the knee.  Discussed analgesics, ice, rest.  Encouraged home exercise program, physical therapy.      Follow-up 4 to 6 weeks

## 2024-03-13 ENCOUNTER — OFFICE VISIT (OUTPATIENT)
Dept: CARDIOLOGY | Facility: CLINIC | Age: 64
End: 2024-03-13
Payer: COMMERCIAL

## 2024-03-13 VITALS
SYSTOLIC BLOOD PRESSURE: 145 MMHG | HEART RATE: 71 BPM | HEIGHT: 67 IN | DIASTOLIC BLOOD PRESSURE: 92 MMHG | RESPIRATION RATE: 16 BRPM | BODY MASS INDEX: 23.7 KG/M2 | OXYGEN SATURATION: 96 % | WEIGHT: 151 LBS

## 2024-03-13 DIAGNOSIS — R93.1 AGATSTON CORONARY ARTERY CALCIUM SCORE BETWEEN 100 AND 400: ICD-10-CM

## 2024-03-13 DIAGNOSIS — E78.5 DYSLIPIDEMIA: ICD-10-CM

## 2024-03-13 DIAGNOSIS — I25.10 ARTERIOSCLEROSIS OF CORONARY ARTERY: Primary | ICD-10-CM

## 2024-03-13 PROCEDURE — 1036F TOBACCO NON-USER: CPT | Performed by: INTERNAL MEDICINE

## 2024-03-13 PROCEDURE — 99213 OFFICE O/P EST LOW 20 MIN: CPT | Performed by: INTERNAL MEDICINE

## 2024-03-13 ASSESSMENT — PATIENT HEALTH QUESTIONNAIRE - PHQ9
1. LITTLE INTEREST OR PLEASURE IN DOING THINGS: NOT AT ALL
2. FEELING DOWN, DEPRESSED OR HOPELESS: NOT AT ALL
SUM OF ALL RESPONSES TO PHQ9 QUESTIONS 1 AND 2: 0

## 2024-03-13 ASSESSMENT — COLUMBIA-SUICIDE SEVERITY RATING SCALE - C-SSRS
6. HAVE YOU EVER DONE ANYTHING, STARTED TO DO ANYTHING, OR PREPARED TO DO ANYTHING TO END YOUR LIFE?: NO
1. IN THE PAST MONTH, HAVE YOU WISHED YOU WERE DEAD OR WISHED YOU COULD GO TO SLEEP AND NOT WAKE UP?: NO
2. HAVE YOU ACTUALLY HAD ANY THOUGHTS OF KILLING YOURSELF?: NO

## 2024-03-13 NOTE — PROGRESS NOTES
Subjective   Harjit Angeles is a 63 y.o. male who presents to the [facility]  for follow up of coronary arteriosclerosis with elevated CT calcium score. Last seen in September 2023.     5/16/2022 CT calcium score was 214. OLIVIA 10 year risk score 10% (family history, dyslipidemia).     No active cardiac symptoms of chest pain, dyspnea on exertion, PND, orthopnea, REMI, palpitations, syncope, or claudication. Does aerobic exercise 3-5x/week (golf, walking). Home BP readings 120-130 mm Hg on home cuff. Recovering from right knee meniscus surgery on 2/14/2024.     Past Medical History:  1. Coronary arteriosclerosis: 5/16/2022 CT calcium score 214; 10 year OLIVIA risk score 10%   2. Dyslipidemia: 2/21/2022 (no tx):  / HDL 67 /  / TG 58; 1/26/2023 (atorva 20 mg):  / TG 63 / HDL 72 / LDL 67  3. h/o Vit D deficiency     Social History:  Nonsmoker. .     Family History:  Father had CAD with MI in his mid 70s.     Review of Systems    A 14 point review of systems was asked. All questions were negative except for pertinent positives listed in the HPI.     Current Outpatient Medications on File Prior to Visit   Medication Sig Dispense Refill    atorvastatin (Lipitor) 20 mg tablet Take 1 tablet (20 mg) by mouth once daily at bedtime. 90 tablet 3    cholecalciferol (Vitamin D-3) 25 MCG (1000 UT) tablet Take 1 tablet (25 mcg) by mouth once daily. Additional 1000 units      coenzyme Q-10 (Co Q-10) 100 mg capsule Take 1 capsule (100 mg) by mouth once daily. 30 capsule 11     No current facility-administered medications on file prior to visit.         Objective   Physical Exam  BP Readings from Last 3 Encounters:   03/13/24 (!) 145/92   02/19/24 124/76   02/14/24 138/77      Wt Readings from Last 3 Encounters:   03/13/24 68.5 kg (151 lb)   02/19/24 68.4 kg (150 lb 12.8 oz)   02/14/24 66.5 kg (146 lb 9.7 oz)      BMI: Estimated body mass index is 23.65 kg/m² as calculated from the  "following:    Height as of this encounter: 1.702 m (5' 7\").    Weight as of this encounter: 68.5 kg (151 lb).  BSA: Estimated body surface area is 1.8 meters squared as calculated from the following:    Height as of this encounter: 1.702 m (5' 7\").    Weight as of this encounter: 68.5 kg (151 lb).    General: no acute distress  HEENT: EOMI, no scleral icterus.  Lungs: Clear to auscultation bilaterally without wheezing, rales, or rhonchi.  Cardiovascular: Regular rhythm and rate. Normal S1 and S2. No murmurs, rubs, or gallops are appreciated. JVP normal.  Abdomen: Soft, nontender, nondistended. Bowel sounds present.  Extremities: Warm and well perfused with equal 2+ pulses bilaterally.  No edema present.  Neurologic: Alert and oriented x3.    I have personally reviewed the following images and laboratory findings:  Last echocardiogram:  None on file    Last cath / stress test / CACS:  2022 CT calcium score was 214. OLIVIA 10 year risk score 10%    Most recent EC2023 ECG: Sinus rhythm, 56 bpm, normal ECG. Personally reviewed in office.    Lab Results   Component Value Date    CHOL 130 2024    CHOL 151 2023    CHOL 195 2022     Lab Results   Component Value Date    HDL 63.6 2024    HDL 71.8 2023    HDL 67.0 2022     Lab Results   Component Value Date    LDLCALC 47 2024     Lab Results   Component Value Date    TRIG 95 2024    TRIG 63 2023    TRIG 58 2022     No components found for: \"CHOLHDL\"      Assessment/Plan   1. Coronary arteriosclerosis:  May 2022 CT calcium score was 214. 10 year OLIVIA risk score for MI is 10%.  - recommend we defer use of aspirin 81 mg primary prevention given risk category is mild/moderate border (if future risk > 20%, would start aspirin)  - continue moderate intensity statin, atorvastatin 20 mg a day. 2024 LDL level 47 down from 116 in 2022 pre-statin. At goal of long term < 70 target.   - lifestyle " modification program reviewed in detail. See patient summary.  - blood pressure at goal 120-130 mm Hg range at home without medication on ambulatory monitoring. Slightly above goal today in office at 140/90 mm Hg. Will calibrate home BP cuff at next visit and have Mr. Angeles keep a log book. Home readings have been 120s/70s. Defer BP medication.  - nonsmoker     2. Dyslipidemia:  OLIVIA 10 year risk score > 7.5% based on 5/2022 CT calcium score of 214. Continue moderate intensity statin, atorvastatin 20 mg. February 2024 LDL at goal at level 47.     Follow up with Dr. Rg in 12 months.            SIGNATURE: Tod Rg MD PATIENT NAME: Harjit Angeles   DATE/TIME: March 13, 2024 8:47 AM MRN: [id number]

## 2024-03-13 NOTE — PATIENT INSTRUCTIONS
You were seen in the [facility] for your coronary artery disease with high calcium score.     Your May 2022 CT calcium score was 214 which implies a moderate amount of atherosclerosis (hardening of the heart arteries). Based on your current risk factors (family history, blood pressure, cholesterol numbers), you had a 10% chance of having a heart attack in the next 10 years before we made changes.     I recommend that we do the following to reduce your heart attack risk:  1. Continue your cholesterol medication, atorvastatin 20 mg a day. This strong cholesterol medication is blocking the liver from making extra cholesterol and is helping to remove cholesterol plaque from the artery walls. Your LDL (bad) cholesterol is now at goal < 70 with a LDL level of 47 on 2/19/2024 blood work.    2. Your blood pressure on home checks is near goal range 120-130 mm Hg. Eat a low salt diet (sodium limit of 5957-3577 mg a day) to help lower your blood pressure. Extra sodium causes an increase in the volume of blood inside your blood vessels and this increases your blood pressure. Blood pressure in the office was higher than goal at 138/90 mm Hg    3. Moderate intensity exercise at least 3 times a week for 30-45 minutes a time. Exercise helps protect the heart and blood vessels.    4. Eat a heart healthy diet. Limit portions of red meat. Eat fresh fruits and vegetables instead of processed carbohydrates. Olive oil (1 tablespoon a day), avocados, tree nuts as a source of omega-3 fat. Beans and legumes are good sources of plant based protein and fiber. The Mediterranean diet has been shown in clinical trials to reduce risk of heart disease by following these principles.     Keep a log book of home blood pressure readings. If your blood pressure is > 140 mm Hg on average, we will need to start blood pressure medication. Your blood pressure readings are okay at your primary care doctor's office.     Follow up  with Dr. Rg in 12 months.

## 2024-04-01 ENCOUNTER — OFFICE VISIT (OUTPATIENT)
Dept: ORTHOPEDIC SURGERY | Facility: CLINIC | Age: 64
End: 2024-04-01
Payer: COMMERCIAL

## 2024-04-01 DIAGNOSIS — S83.231D COMPLEX TEAR OF MEDIAL MENISCUS OF RIGHT KNEE AS CURRENT INJURY, SUBSEQUENT ENCOUNTER: Primary | ICD-10-CM

## 2024-04-01 PROCEDURE — 99024 POSTOP FOLLOW-UP VISIT: CPT | Performed by: ORTHOPAEDIC SURGERY

## 2024-04-01 NOTE — PROGRESS NOTES
History of Present Illness  Patient returns today noting minimal pain.  Every day is better.  Knee continues to improve dramatically  Exam  No effusion, full motion.  5/5 quad strength   Distal neurovascular exam intact     Operative Findings  Flap tear medial meniscus     Assessment  Patient status post right knee arthroscopy partial medial meniscectomy      Plan  Reviewed arthroscopic photos and findings.  Discussed short and long term implications for the knee.  Discussed analgesics, ice, rest.  Encouraged home exercise program, physical therapy.   Follow-up as needed

## 2024-05-06 NOTE — OP NOTE
SURGEON:  Octavio Harrell MD    ANESTHESIA:    General endotracheal.    ASSISTANT:    Elijah Shields/Jose Francisco.    PREOPERATIVE DIAGNOSIS:    Bilateral inguinal hernias.    POSTOPERATIVE DIAGNOSIS:    Bilateral inguinal hernias.    PROCEDURES:    Laparoscopic robotically-assisted repair, bilateral inguinal hernias.    INDICATIONS:    This 63-year-old male presents with painful mass in the left groin.  Physical exam is consistent with reducible bilateral inguinal  hernias.  The risks, the benefits, the alternatives, as well as the  risks, benefits, the alternatives to laparoscopic  robotically-assisted repair of the hernias were discussed prior to  surgery.     DESCRIPTION OF PROCEDURE:    The patient was placed on the operating room table.  After adequate  levels of induction of general endotracheal anesthesia, the abdomen  had been prepped and draped in a fashion suitable for surgery.  Two  phase audible time-out including participation by the patient in  preinduction phase was performed in standard fashion.  All questions  were answered.  All agreed to proceed.     The abdomen again had been prepped and draped in a sterile fashion.  Barnhart catheter was in place.  Scrotal wrap was in place.  Mini-laparotomy above the umbilicus was outlined using a marking pen  at the midline.  The area was anesthetized with 0.5% Marcaine plain  throughout the skin and subcutaneous tissue.  The skin incision was  then carried from the dermis to subcutaneous tissue using the knife.  Behind blunt dissection, the peritoneum was isolated, placed between  holding sutures of 0 Vicryl, tented, opened behind sharp scissor  dissection to atraumatically into the abdominal cavity.  8 mm da  Roderick port was introduced and pneumoperitoneum was established to 15  cm.  Patient was placed in Trendelenburg position.  Presentation of  the pelvis revealed a large direct left inguinal hernia with a  smaller direct right inguinal hernia.   Subsequently, two 8 mm da  Roderick ports were placed approximately 10 cm from the midline at the  level of the port in standard fashion without complication.  Two  pieces of 10 x 15 ProGrip mesh were appropriately tailored, enclosing  a 2-0 V-Loc needle passed respectively into the abdominal cavity  under laparoscopic vision without complication.  The da Roderick Xi  robot was then docked in standard fashion.  The operation moved to  the console.  With scissors to the right, cardiac grasper to the  left, attention was first directed to the right side.  The peritoneum  was scored approximately 4 cm above the defect lateral to the  epigastric behind sharp scissor and cautery dissection.  The  dissection moved at the peritoneal plane to the level of the  umbilical ligament.  The dissection then moved inferiorly taking the  defect and preperitoneal fat from the transversalis fascia down to  expose Ren[place] ligament.  This was skeletonized below the Ren[place]  ligament.  The space of Retzius behind scissor and cautery  dissection.  The dissection moved medially across the midline  exposing the pubic tubercle.  The dissection then moved laterally,  freeing the cord structures from the peritoneal surface.  No energy  was used laterally.  There was no lipoma within the cord.  The mesh  was then centered over the direct defect, opened in standard fashion  with good coverage above and below and extending to the space of  Retzius.  The peritoneum was then closed from a lateral to medial  aspect using the 2-0 V-Loc needle.  The needle was removed under  laparoscopic vision.  Attention then was then directed to the  contralateral side.  Again, with scissors to the right, cardia  grasper to the left, I then again scored the peritoneum in standard  fashion across to the level of the umbilical ligament.  The  dissection then moved inferiorly, identifying the mesh from the  contralateral side as well as down to the Reno ligament.   Again,  skeletonized into the space of Retzius.  I moved the dissection  laterally.  The peritoneum was __________ at this point.  Again,  there was no evidence of indirect hernia.  Minimal lipoma of tissue  was left within the cord.  The scissors were exchanged for the needle  .  The mesh was then centered over the large direct defect.  This was flat.  The mesh extended across the midline, approximately 2  cm.  The peritoneum was then closed from a lateral to medial aspect  using the 2-0 V-Loc.  The redundant sac like tissue from the direct  hernia was used to bolster the peritoneal closure insuring that no  mesh was then able to be in contact with the abdominal wall.  Needle  again was removed under laparoscopic vision.  I examined the area  under all ports.  All structures were intact.  The robot was then  undocked.  50 cc of 0.25% Marcaine with clonidine was used as a  circumferential TAP block over the lower pelvis.  All ports were then  removed without evidence of bleeding from the abdominal wall with  closure of the midline port with a figure-of-eight using the spy  device.  The skin was closed with running subcuticular closures of  4-0 Vicryl.  Steri-Strips, dry sterile occlusive dressings were  applied.  Patient tolerated the procedure without complication,  transferred to the recovery room following extubation in stable  condition.  Needle and sponge counts reported to be correct.  Barnhart  catheter was removed.  Patient tolerated the procedure without  complication, transferred to recovery room following extubation in  stable condition.       Octavio Harrell MD    DD:  06/09/2023 14:32:50 EST  DT:  06/10/2023 05:09:34 EST  DICTATION NUMBER:  [id number]  INTERNAL JOB NUMBER:  [id number]             Electronic Signatures:  Octavio Harrell (MD) (Signed on 10-José Miguel-2023 10:25)   Authored  Unsigned, Draft (SYS GENERATED) (Entered on 10-José Miguel-2023 05:09)   Entered    Last Updated: 10-José Miguel-2023 10:25 by  Octavio Harrell)

## 2024-06-17 ENCOUNTER — LAB (OUTPATIENT)
Dept: LAB | Facility: LAB | Age: 64
End: 2024-06-17
Payer: COMMERCIAL

## 2024-08-13 ENCOUNTER — LAB (OUTPATIENT)
Dept: LAB | Facility: LAB | Age: 64
End: 2024-08-13
Payer: COMMERCIAL

## 2024-08-13 DIAGNOSIS — E55.9 VITAMIN D DEFICIENCY: ICD-10-CM

## 2024-08-13 LAB — 25(OH)D3 SERPL-MCNC: 44 NG/ML (ref 30–100)

## 2024-08-13 PROCEDURE — 36415 COLL VENOUS BLD VENIPUNCTURE: CPT

## 2024-08-13 PROCEDURE — 82306 VITAMIN D 25 HYDROXY: CPT

## 2025-02-18 DIAGNOSIS — E78.5 DYSLIPIDEMIA: ICD-10-CM

## 2025-02-18 RX ORDER — ATORVASTATIN CALCIUM 20 MG/1
20 TABLET, FILM COATED ORAL NIGHTLY
Qty: 30 TABLET | Refills: 11 | Status: SHIPPED | OUTPATIENT
Start: 2025-02-18

## 2025-02-18 NOTE — TELEPHONE ENCOUNTER
Patient is rescheduled for June 2025. Cannot do sooner. Trying to figure out insurance situation. 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 He wanted to schedule out a few months so he has time to find new insurance that covers Dr. Carbajal.

## 2025-03-19 ENCOUNTER — APPOINTMENT (OUTPATIENT)
Dept: CARDIOLOGY | Facility: CLINIC | Age: 65
End: 2025-03-19
Payer: COMMERCIAL

## 2025-04-23 ENCOUNTER — APPOINTMENT (OUTPATIENT)
Dept: CARDIOLOGY | Facility: CLINIC | Age: 65
End: 2025-04-23
Payer: COMMERCIAL

## 2025-04-30 ENCOUNTER — APPOINTMENT (OUTPATIENT)
Dept: PRIMARY CARE | Facility: CLINIC | Age: 65
End: 2025-04-30
Payer: COMMERCIAL

## 2025-06-17 ENCOUNTER — APPOINTMENT (OUTPATIENT)
Dept: PRIMARY CARE | Facility: CLINIC | Age: 65
End: 2025-06-17
Payer: COMMERCIAL

## 2025-09-30 ENCOUNTER — APPOINTMENT (OUTPATIENT)
Dept: PRIMARY CARE | Facility: CLINIC | Age: 65
End: 2025-09-30
Payer: COMMERCIAL

## (undated) DEVICE — SUTURE, VICRYL, 2-0, 27 IN, X-1, UNDYED

## (undated) DEVICE — STRIP, SKIN CLOSURE, STERI STRIP, REINFORCED, 0.5 X 4 IN

## (undated) DEVICE — TUBING, SUCTION, 6MM X 10, CLEAN N-COND

## (undated) DEVICE — DRESSING, GAUZE, SPONGE, 12 PLY, 4 X 4 IN, PLASTIC POUCH, STRL 10PK

## (undated) DEVICE — GOWN, SURGICAL, IMPLT, BACK, XLARGE, XLONG, STERILE

## (undated) DEVICE — GLOVE, SURGICAL, ORTHO, PROTEXIS, HYDROGEL, 8.0, PF, LATEX

## (undated) DEVICE — HANDLE, LITE EZ, PLASTIC, DISP, LF

## (undated) DEVICE — DRESSING, ABDOMINAL PAD, CURITY, 7.5 X 8 IN

## (undated) DEVICE — PREP, IODOPHOR, W/ALCOHOL, DURAPREP, W/APPLICATOR, 26 CC

## (undated) DEVICE — STRAP, ARM BOARD, 32 X 1.5

## (undated) DEVICE — BANDAGE, ELASTIC, 6 X 10YD, BEIGE, LF

## (undated) DEVICE — STRAP, VELCRO, BODY, 4 X 60IN, NS

## (undated) DEVICE — GLOVE, SURGICAL, PROTEXIS PI BLUE W/NEUTHERA, 8.0, PF, LF

## (undated) DEVICE — TUBING, PATIENT 8FT STERILE

## (undated) DEVICE — SUTURE, VICRYL, 1, 27 IN, CP, VIOLET

## (undated) DEVICE — PADDING, CAST, SPECIALIST, 6 IN X 4 YD, STERILE

## (undated) DEVICE — MANIFOLD, 4 PORT NEPTUNE STANDARD

## (undated) DEVICE — COVER, MAYO STAND, W/PAD, 23 IN, DISPOSABLE, PLASTIC, LF, STERILE

## (undated) DEVICE — Device

## (undated) DEVICE — SOLUTION, TOPICAL, ALCOHOL, ISOPROPYL 70%, 16 OZ

## (undated) DEVICE — TOWELS 4-PK

## (undated) DEVICE — MAT, FLOOR, STANDARD FLUID BARRIER, 32X44, GREEN

## (undated) DEVICE — PROBE, SERFAS, 3.5MM, 50 S, ENERGY SUCTION SYSTEM